# Patient Record
Sex: FEMALE | Race: WHITE | Employment: OTHER | URBAN - METROPOLITAN AREA
[De-identification: names, ages, dates, MRNs, and addresses within clinical notes are randomized per-mention and may not be internally consistent; named-entity substitution may affect disease eponyms.]

---

## 2018-11-12 ENCOUNTER — HOSPITAL ENCOUNTER (OUTPATIENT)
Dept: ULTRASOUND IMAGING | Age: 68
Discharge: HOME | End: 2018-11-12
Attending: INTERNAL MEDICINE
Payer: MEDICARE

## 2018-11-12 VITALS
OXYGEN SATURATION: 94 % | HEART RATE: 92 BPM | WEIGHT: 183 LBS | TEMPERATURE: 98.7 F | BODY MASS INDEX: 35.93 KG/M2 | HEIGHT: 60 IN | RESPIRATION RATE: 18 BRPM | DIASTOLIC BLOOD PRESSURE: 57 MMHG | SYSTOLIC BLOOD PRESSURE: 125 MMHG

## 2018-11-12 DIAGNOSIS — K76.0 FATTY (CHANGE OF) LIVER, NOT ELSEWHERE CLASSIFIED: ICD-10-CM

## 2018-11-12 LAB — GLUCOSE BLD STRIP.AUTO-MCNC: 158 MG/DL (ref 65–100)

## 2018-11-12 PROCEDURE — 88307 TISSUE EXAM BY PATHOLOGIST: CPT

## 2018-11-12 PROCEDURE — 82962 GLUCOSE BLOOD TEST: CPT

## 2018-11-12 PROCEDURE — 88313 SPECIAL STAINS GROUP 2: CPT

## 2018-11-12 PROCEDURE — 74011250636 HC RX REV CODE- 250/636: Performed by: RADIOLOGY

## 2018-11-12 PROCEDURE — 77030003503 US GUIDE BX LIV PERC

## 2018-11-12 PROCEDURE — 88312 SPECIAL STAINS GROUP 1: CPT

## 2018-11-12 PROCEDURE — 74011250636 HC RX REV CODE- 250/636

## 2018-11-12 PROCEDURE — 99152 MOD SED SAME PHYS/QHP 5/>YRS: CPT

## 2018-11-12 RX ORDER — MIDAZOLAM HYDROCHLORIDE 1 MG/ML
.5-2 INJECTION, SOLUTION INTRAMUSCULAR; INTRAVENOUS
Status: DISCONTINUED | OUTPATIENT
Start: 2018-11-12 | End: 2018-11-16 | Stop reason: HOSPADM

## 2018-11-12 RX ORDER — SODIUM CHLORIDE 9 MG/ML
25 INJECTION, SOLUTION INTRAVENOUS CONTINUOUS
Status: DISCONTINUED | OUTPATIENT
Start: 2018-11-12 | End: 2018-11-16 | Stop reason: HOSPADM

## 2018-11-12 RX ORDER — FENTANYL CITRATE 50 UG/ML
25-50 INJECTION, SOLUTION INTRAMUSCULAR; INTRAVENOUS
Status: DISCONTINUED | OUTPATIENT
Start: 2018-11-12 | End: 2018-11-16 | Stop reason: HOSPADM

## 2018-11-12 RX ORDER — IBUPROFEN 200 MG
600 TABLET ORAL
Status: DISCONTINUED | OUTPATIENT
Start: 2018-11-12 | End: 2018-11-16 | Stop reason: HOSPADM

## 2018-11-12 RX ORDER — LIDOCAINE HYDROCHLORIDE 20 MG/ML
1-10 INJECTION, SOLUTION EPIDURAL; INFILTRATION; INTRACAUDAL; PERINEURAL ONCE
Status: COMPLETED | OUTPATIENT
Start: 2018-11-12 | End: 2018-11-12

## 2018-11-12 RX ADMIN — MIDAZOLAM HYDROCHLORIDE 1 MG: 1 INJECTION, SOLUTION INTRAMUSCULAR; INTRAVENOUS at 08:32

## 2018-11-12 RX ADMIN — SODIUM CHLORIDE 25 ML/HR: 900 INJECTION, SOLUTION INTRAVENOUS at 08:20

## 2018-11-12 RX ADMIN — MIDAZOLAM HYDROCHLORIDE 1 MG: 1 INJECTION, SOLUTION INTRAMUSCULAR; INTRAVENOUS at 08:26

## 2018-11-12 RX ADMIN — FENTANYL CITRATE 50 MCG: 50 INJECTION, SOLUTION INTRAMUSCULAR; INTRAVENOUS at 08:26

## 2018-11-12 RX ADMIN — LIDOCAINE HYDROCHLORIDE 10 ML: 20 INJECTION, SOLUTION EPIDURAL; INFILTRATION; INTRACAUDAL; PERINEURAL at 08:33

## 2018-11-12 RX ADMIN — FENTANYL CITRATE 50 MCG: 50 INJECTION, SOLUTION INTRAMUSCULAR; INTRAVENOUS at 08:32

## 2018-11-12 NOTE — H&P
Department of Interventional Radiology  [phone]    History and Physical    Patient:  Alana Gore MRN:  [id number]  SSN:  [id number]    YOB: 1950  Age:  70 y.o. Sex:  female      Primary Care Provider:  Chantal Reid MD  Referring Physician:  Santiago Mcgee MD    Subjective:     Chief Complaint: biopsy     History of the Present Illness: The patient is a 70 y.o. female who presents for random liver biopsy. Imaging suggestive of cirrhosis. EGD with portal HTN, esophageal varices. NPO         Past Medical History:   Diagnosis Date    Arthritis     osteo    Cancer ([address]     left breast    Chronic pain     left knee    Diabetes ([facility] [address]     Type 2  -150   Hypoglycemia sx @ 79    GERD (gastroesophageal reflux disease)     controlled with medication    Hypercholesteremia     Hypertension     controlled with medication    Liver disease     fatty liver    Morbid obesity ([address] 11/3/14    BMI  38.6    Psychiatric disorder     depression    S/P total knee arthroplasty 2014    Thyroid disease     hypothyroid    Unspecified adverse effect of anesthesia     NO BP or Venipuncture left arm     Past Surgical History:   Procedure Laterality Date    HX  SECTION      HX HYSTERECTOMY      HX KNEE ARTHROSCOPY Left 1982    HX MASTECTOMY Left     HX OPEN CHOLECYSTECTOMY  2003    HX ORTHOPAEDIC Right 2014    carpal tunnel    HX ORTHOPAEDIC Left     ganglion cyst removed from knee    HX ORTHOPAEDIC Left     carpal tunnel    HX ORTHOPAEDIC Left     ganglion cyst wrist        Review of Systems:    Pertinent items are noted in the History of Present Illness. Current Outpatient Medications   Medication Sig    losartan (COZAAR) 100 mg tablet Take 100 mg by mouth daily. Indications: HYPERTENSION    metformin (GLUCOPHAGE) 1,000 mg tablet Take 2,000 mg by mouth every evening.  Indications: TYPE 2 DIABETES MELLITUS    insulin aspart (NOVOLOG FLEXPEN) 100 unit/mL flexpen 18 Units by SubCUTAneous route two (2) times daily (with meals). Breakfast & lunch   Indications: TYPE 2 DIABETES MELLITUS    insulin glargine (LANTUS SOLOSTAR) 100 unit/mL (3 mL) pen 80 Units by SubCUTAneous route daily. Take 1/2 usual AM dose DOS. Take 40 units DOS   Indications: TYPE 2 DIABETES MELLITUS    oxybutynin (DITROPAN) 5 mg tablet Take 5 mg by mouth nightly. Indications: BLADDER HYPERACTIVITY    simvastatin (ZOCOR) 20 mg tablet Take  by mouth nightly.  thyroid, Pork, (ARMOUR THYROID) 60 mg tablet Take 60 mg by mouth daily. Take DOS per anesthesia protocol. Indications: HYPOTHYROIDISM    omeprazole (PRILOSEC) 20 mg capsule Take 20 mg by mouth every morning. Take DOS per anesthesia protocol. Indications: GASTROESOPHAGEAL REFLUX    oxyCODONE IR (ROXICODONE) 5 mg immediate release tablet Take 1 tablet by mouth every four (4) hours as needed.  insulin aspart (NOVOLOG FLEXPEN) 100 unit/mL flexpen 16 Units by SubCUTAneous route every evening. Indications: TYPE 2 DIABETES MELLITUS    diphenhydrAMINE (BENADRYL) 25 mg capsule Take 25 mg by mouth every six (6) hours as needed. Take if needed DOS per anesthesia protocol. Indications: itching    citalopram (CELEXA) 40 mg tablet Take 60 mg by mouth every evening. Indications: depression    multivitamin (ONE A DAY) tablet Take 1 Tab by mouth daily.  Last dose 5/9/14     Current Facility-Administered Medications   Medication Dose Route Frequency    lidocaine (PF) (XYLOCAINE) 20 mg/mL (2 %) injection  mg  1-10 mL SubCUTAneous ONCE    0.9% sodium chloride infusion  25 mL/hr IntraVENous CONTINUOUS    fentaNYL citrate (PF) injection 25-50 mcg  25-50 mcg IntraVENous Multiple    midazolam (VERSED) injection 0.5-2 mg  0.5-2 mg IntraVENous Multiple        Allergies   Allergen Reactions    Demerol [Meperidine] Other (comments)     Stopped breathing    Dilaudid [Hydromorphone] Other (comments)     hallucinations    Macrodantin [Nitrofurantoin Macrocrystalline] Other (comments)     Shaking, passed out    Sulfa (Sulfonamide Antibiotics) Swelling     swelling in throat, hives    Tape [Adhesive] Rash     bandaids       Family History   Problem Relation Age of Onset    Alzheimer Mother     Other Father          of ruptured AAA    Heart defect Sister          of MI age 68    Other Sister         [place]il [place] disease-2 sisters with it   [facility] Alcohol abuse Brother     Diabetes Sister     Colon Cancer Sister     Colon Cancer Brother     Stroke Sister     Dementia Sister     Sudden Death Other      Social History     Tobacco Use    Smoking status: Never Smoker    Smokeless tobacco: Never Used   Substance Use Topics    Alcohol use: No        Objective:       Physical Examination:    Vitals:    18 0718   BP: 137/62   Pulse: 92   Resp: 16   Temp: 98.7 °F (37.1 °C)   SpO2: 94%   Weight: 83 kg (183 lb)   Height: 5' (1.524 m)     Blood pressure 137/62, pulse 92, temperature 98.7 °F (37.1 °C), resp. rate 16, height 5' (1.524 m), weight 83 kg (183 lb), SpO2 94 %, not currently breastfeeding.   HEART: regular rate and rhythm  LUNG: clear to auscultation bilaterally  ABDOMEN: normal findings: soft, non-tender  EXTREMITIES: normal strength, tone, and muscle mass, no deformities, no erythema, induration, or nodules    Laboratory:     Lab Results   Component Value Date/Time    Sodium 140 2015 08:35 AM    Sodium 138 2014 01:15 AM    Potassium 3.7 2015 08:35 AM    Potassium 3.9 2014 01:15 AM    Chloride 94 (L) 2015 08:35 AM    Chloride 102 2014 01:15 AM    CO2 29 2015 08:35 AM    CO2 28 2014 01:15 AM    Anion gap 8 2014 01:15 AM    Anion gap 9 2014 03:53 AM    Glucose 159 (H) 2015 08:35 AM    Glucose 107 (H) 2014 01:15 AM    BUN 13 2015 08:35 AM    BUN 12 2014 01:15 AM    Creatinine 0.80 2015 08:35 AM    Creatinine 0.90 2014 01:15 AM GFR est AA 89 11/30/2015 08:35 AM    GFR est AA >60 11/24/2014 01:15 AM    GFR est non-AA 77 11/30/2015 08:35 AM    GFR est non-AA >60 11/24/2014 01:15 AM    Calcium 9.8 11/30/2015 08:35 AM    Calcium 8.7 11/24/2014 01:15 AM    ALT (SGPT) 18 11/30/2015 08:35 AM     Lab Results   Component Value Date/Time    WBC 11.0 11/24/2014 01:15 AM    WBC 9.2 11/03/2014 11:00 AM    HGB 8.4 (L) 11/24/2014 01:15 AM    HGB 8.5 (L) 11/23/2014 04:25 AM    HCT 27.2 (L) 11/24/2014 01:15 AM    HCT 39.6 11/03/2014 11:00 AM    PLATELET 027 62/54/3463 01:15 AM    PLATELET 919 22/13/7746 11:00 AM     Lab Results   Component Value Date/Time    aPTT 26.2 11/03/2014 11:00 AM    Prothrombin time 10.6 11/03/2014 11:00 AM    INR 1.0 11/03/2014 11:00 AM       Assessment:     Fatty liver    Plan:     Planned Procedure:  Liver biopsy    Risks, benefits, and alternatives reviewed with patient and she agrees to proceed with the procedure.       Signed By: Adrian Vo PA-C     November 12, 2018

## 2018-11-12 NOTE — PROGRESS NOTES
TRANSFER - OUT REPORT:    Verbal report given to Naila Ham RN on Valeria Pyle  being transferred to  for routine progression of care       Report consisted of patients Situation, Background, Assessment and   Recommendations(SBAR). Information from the following report(s) Procedure Summary and MAR was reviewed with the receiving nurse. Lines:   Peripheral IV 11/12/18 Right Wrist (Active)   Site Assessment Clean, dry, & intact 11/12/2018  7:36 AM   Phlebitis Assessment 0 11/12/2018  7:36 AM   Dressing Status Clean, dry, & intact 11/12/2018  7:36 AM   Dressing Type Transparent 11/12/2018  7:36 AM        Opportunity for questions and clarification was provided.       Patient transported with:   Registered Nurse

## 2018-11-12 NOTE — PROCEDURES
Department of Interventional Radiology  [phone]        Interventional Radiology Brief Procedure Note    Patient: Dae Ireland MRN: [id number]  SSN: [id number]    YOB: 1950  Age: 70 y.o.   Sex: female      Date of Procedure: 11/12/2018    Pre-Procedure Diagnosis: elevated LFTs    Post-Procedure Diagnosis: SAME    Procedure(s): Image Guided Biopsy    Brief Description of Procedure: Ultrasound guided random liver biopsy    Performed By: Dixon Avelar MD     Assistants: None    Anesthesia:Moderate Sedation    Estimated Blood Loss: Less than 10ml    Specimens:  3 x 18 g cores    Implants:  None    Findings: Cirrhotic liver    Complications: None    Recommendations: routine post care     Follow Up: as needed    Signed By: Dixon Avelar MD     November 12, 2018

## 2018-11-12 NOTE — PROGRESS NOTES
Recovery period without difficulty. Pt alert and oriented and denies pain. Dressing is clean, dry, and intact. Reviewed discharge instructions with patient and , both verbalized understanding. Pt escorted to lobby discharge area via wheelchair. Vital signs and [place] score completed.

## 2018-11-12 NOTE — DISCHARGE INSTRUCTIONS
[address]  Department of Interventional Radiology  [facility]  [phone] Office  [phone] Fax    BIOPSY DISCHARGE INSTRUCTIONS    General Instructions:     A biopsy is the removal of a small piece of tissue for microscopic examination or testing. Healthy tissue can be obtained for the purpose of tissue-type matching for transplants. Unhealthy tissues are more commonly biopsied to diagnose disease. Lung Biopsy:     A needle lung biopsy is performed when there is a mass discovered in the lung area. The most serious risk is infection, bleeding, and pneumothorax (a collapsed lung). Signs of pneumothorax include shortness of breath, rapid heart rate, and blueness of the skin. If any of these occur, call 911. Liver Biopsy: This test helps detect cancer, infections, and the cause of an enlargement of the liver or elevated liver enzymes. It also helps to diagnose a number of liver diseases. The pain associated with the procedure may be felt in the shoulder. The risks include internal bleeding, pneumothorax, and injury to the surrounding organs. Renal Biopsy: This procedure is sometimes done to evaluate a transplanted kidney. It is also used to evaluate unexplained decrease in kidney function, blood, or protein in the urine. You may see bright red blood in the urine the first 24 hours after the test. If the bleeding lasts longer, you need to call your doctor. There is a risk of infection and bleeding into the muscle, which may cause soreness. Spinal Biopsy: This test is sometimes done in conjunction with other procedures. Your back will be sore, as we are taking a small sample of bone, which is slightly more difficult to sample than tissue. General Biopsy:     A mass can grow in any area of the body, and we are taking a specimen as ordered by your doctor. The risks are the same.  They include bleeding, pain, and infection. Home Care Instructions: You may resume your regular diet and medication regimen. Do not drink alcohol, drive, or make any important legal decisions in the next 24 hours. Do not lift anything heavier than a gallon of milk until the soreness goes away. You may use over the counter acetaminophen or ibuprofen for the soreness. You may apply an ice pack to the affected area for 20-30 minutes at time for the first 24 hours. After that, you may apply a heat pack. Call If: You should call your Physician and/or the Radiology Nurse if you have any questions or concerns about the biopsy site. Call if you should have increased pain, fever, redness, drainage, or bleeding more than a small spot on the bandage. Follow-Up Instructions: Please see your ordering doctor as he/she has requested. To Reach Us: If you have any questions about your procedure, please call the Interventional Radiology department at [phone]. After business hours (5pm) and weekends, call the answering service at [phone] and ask for the Radiologist on call to be paged. Interventional Radiology General Nurse Discharge    After general anesthesia or intravenous sedation, for 24 hours or while taking prescription Narcotics:  · Limit your activities  · Do not drive and operate hazardous machinery  · Do not make important personal or business decisions  · Do  not drink alcoholic beverages  · If you have not urinated within 8 hours after discharge, please contact your surgeon on call. * Please give a list of your current medications to your Primary Care Provider. * Please update this list whenever your medications are discontinued, doses are     changed, or new medications (including over-the-counter products) are added. * Please carry medication information at all times in case of emergency situations.     These are general instructions for a healthy lifestyle:    No smoking/ No tobacco products/ Avoid exposure to second hand smoke  Surgeon General's Warning:  Quitting smoking now greatly reduces serious risk to your health. Obesity, smoking, and sedentary lifestyle greatly increases your risk for illness  A healthy diet, regular physical exercise & weight monitoring are important for maintaining a healthy lifestyle    You may be retaining fluid if you have a history of heart failure or if you experience any of the following symptoms:  Weight gain of 3 pounds or more overnight or 5 pounds in a week, increased swelling in our hands or feet or shortness of breath while lying flat in bed. Please call your doctor as soon as you notice any of these symptoms; do not wait until your next office visit. Recognize signs and symptoms of STROKE:  F-face looks uneven    A-arms unable to move or move unevenly    S-speech slurred or non-existent    T-time-call 911 as soon as signs and symptoms begin-DO NOT go       Back to bed or wait to see if you get better-TIME IS BRAIN.             Patient Signature:  Date: 11/12/2018  Discharging Nurse: Edwige Reis

## 2018-11-12 NOTE — H&P
History and Physical    Patient: La Beth MRN: [id number]  SSN: [id number]    YOB: 1950  Age: 70 y.o. Sex: female      Subjective:      La Beth is a 70 y.o. female who has elevated liver enzymes and presents for random liver biopsy. Past Medical History:   Diagnosis Date    Arthritis     osteo    Cancer [address] [address]     left breast    Chronic pain     left knee    Diabetes ([facility] [address]     Type 2  -150   Hypoglycemia sx @ 78    GERD (gastroesophageal reflux disease)     controlled with medication    Hypercholesteremia     Hypertension     controlled with medication    Liver disease     fatty liver    Morbid obesity ([facility] [address] 11/3/14    BMI  38.6    Psychiatric disorder     depression    S/P total knee arthroplasty 2014    Thyroid disease     hypothyroid    Unspecified adverse effect of anesthesia     NO BP or Venipuncture left arm     Past Surgical History:   Procedure Laterality Date    HX  SECTION      HX HYSTERECTOMY      HX KNEE ARTHROSCOPY Left     HX MASTECTOMY Left     HX OPEN CHOLECYSTECTOMY  2003    HX ORTHOPAEDIC Right 2014    carpal tunnel    HX ORTHOPAEDIC Left     ganglion cyst removed from knee    HX ORTHOPAEDIC Left     carpal tunnel    HX ORTHOPAEDIC Left 1973    ganglion cyst wrist      Family History   Problem Relation Age of Onset    Alzheimer Mother     Other Father          of ruptured AAA    Heart defect Sister          of MI age 68    Other Sister         [place] disease-2 sisters with it   [email or web address] Alcohol abuse Brother     Diabetes Sister     Colon Cancer Sister     Colon Cancer Brother     Stroke Sister     Dementia Sister     Sudden Death Other      Social History     Tobacco Use    Smoking status: Never Smoker    Smokeless tobacco: Never Used   Substance Use Topics    Alcohol use: No      Prior to Admission medications    Medication Sig Start Date End Date Taking?  Authorizing Provider   losartan (COZAAR) 100 mg tablet Take 100 mg by mouth daily. Indications: HYPERTENSION   Yes Provider, Historical   metformin (GLUCOPHAGE) 1,000 mg tablet Take 2,000 mg by mouth every evening. Indications: TYPE 2 DIABETES MELLITUS   Yes Provider, Historical   insulin aspart (NOVOLOG FLEXPEN) 100 unit/mL flexpen 18 Units by SubCUTAneous route two (2) times daily (with meals). Breakfast & lunch   Indications: TYPE 2 DIABETES MELLITUS   Yes Provider, Historical   insulin glargine (LANTUS SOLOSTAR) 100 unit/mL (3 mL) pen 80 Units by SubCUTAneous route daily. Take 1/2 usual AM dose DOS. Take 40 units DOS   Indications: TYPE 2 DIABETES MELLITUS   Yes Provider, Historical   oxybutynin (DITROPAN) 5 mg tablet Take 5 mg by mouth nightly. Indications: BLADDER HYPERACTIVITY   Yes Provider, Historical   simvastatin (ZOCOR) 20 mg tablet Take  by mouth nightly. Yes Provider, Historical   thyroid, Pork, (ARMOUR THYROID) 60 mg tablet Take 60 mg by mouth daily. Take DOS per anesthesia protocol. Indications: HYPOTHYROIDISM   Yes Provider, Historical   omeprazole (PRILOSEC) 20 mg capsule Take 20 mg by mouth every morning. Take DOS per anesthesia protocol. Indications: GASTROESOPHAGEAL REFLUX   Yes Provider, Historical   oxyCODONE IR (ROXICODONE) 5 mg immediate release tablet Take 1 tablet by mouth every four (4) hours as needed. 11/24/14   PATRICIA Blackburn   insulin aspart (NOVOLOG FLEXPEN) 100 unit/mL flexpen 16 Units by SubCUTAneous route every evening. Indications: TYPE 2 DIABETES MELLITUS    Provider, Historical   diphenhydrAMINE (BENADRYL) 25 mg capsule Take 25 mg by mouth every six (6) hours as needed. Take if needed DOS per anesthesia protocol. Indications: itching    Provider, Historical   citalopram (CELEXA) 40 mg tablet Take 60 mg by mouth every evening. Indications: depression    Provider, Historical   multivitamin (ONE A DAY) tablet Take 1 Tab by mouth daily.  Last dose 5/9/14    Provider, Historical Allergies   Allergen Reactions    Demerol [Meperidine] Other (comments)     Stopped breathing    Dilaudid [Hydromorphone] Other (comments)     hallucinations    Macrodantin [Nitrofurantoin Macrocrystalline] Other (comments)     Shaking, passed out    Sulfa (Sulfonamide Antibiotics) Swelling     swelling in throat, hives    Tape [Adhesive] Rash     bandaids       Review of Systems:  Pertinent items are noted in the History of Present Illness.     Objective:     Vitals:    11/12/18 0718   BP: 137/62   Pulse: 92   Resp: 16   Temp: 98.7 °F (37.1 °C)   SpO2: 94%   Weight: 83 kg (183 lb)   Height: 5' (1.524 m)        Physical Exam:  LUNG: clear to auscultation bilaterally  rrr    Assessment:     Hospital Problems  Date Reviewed: 5/15/2014    None          Plan:     Ultrasound guided liver biopsy    Signed By: Chen Adair MD     November 12, 2018

## 2019-08-13 ENCOUNTER — ANESTHESIA EVENT (OUTPATIENT)
Dept: ENDOSCOPY | Age: 69
End: 2019-08-13
Payer: MEDICARE

## 2019-08-13 RX ORDER — SODIUM CHLORIDE, SODIUM LACTATE, POTASSIUM CHLORIDE, CALCIUM CHLORIDE 600; 310; 30; 20 MG/100ML; MG/100ML; MG/100ML; MG/100ML
100 INJECTION, SOLUTION INTRAVENOUS CONTINUOUS
Status: CANCELLED | OUTPATIENT
Start: 2019-08-13

## 2019-08-14 ENCOUNTER — ANESTHESIA (OUTPATIENT)
Dept: ENDOSCOPY | Age: 69
End: 2019-08-14
Payer: MEDICARE

## 2019-08-14 ENCOUNTER — HOSPITAL ENCOUNTER (OUTPATIENT)
Age: 69
Setting detail: OUTPATIENT SURGERY
Discharge: HOME | End: 2019-08-14
Attending: INTERNAL MEDICINE | Admitting: INTERNAL MEDICINE
Payer: MEDICARE

## 2019-08-14 VITALS
RESPIRATION RATE: 17 BRPM | WEIGHT: 154 LBS | DIASTOLIC BLOOD PRESSURE: 60 MMHG | HEART RATE: 75 BPM | SYSTOLIC BLOOD PRESSURE: 132 MMHG | HEIGHT: 60 IN | OXYGEN SATURATION: 97 % | TEMPERATURE: 96.8 F | BODY MASS INDEX: 30.23 KG/M2

## 2019-08-14 LAB — GLUCOSE BLD STRIP.AUTO-MCNC: 157 MG/DL (ref 65–100)

## 2019-08-14 PROCEDURE — 76040000025: Performed by: INTERNAL MEDICINE

## 2019-08-14 PROCEDURE — 74011250636 HC RX REV CODE- 250/636

## 2019-08-14 PROCEDURE — 82962 GLUCOSE BLOOD TEST: CPT

## 2019-08-14 PROCEDURE — 74011250636 HC RX REV CODE- 250/636: Performed by: ANESTHESIOLOGY

## 2019-08-14 PROCEDURE — 76060000031 HC ANESTHESIA FIRST 0.5 HR: Performed by: INTERNAL MEDICINE

## 2019-08-14 RX ORDER — SODIUM CHLORIDE, SODIUM LACTATE, POTASSIUM CHLORIDE, CALCIUM CHLORIDE 600; 310; 30; 20 MG/100ML; MG/100ML; MG/100ML; MG/100ML
100 INJECTION, SOLUTION INTRAVENOUS CONTINUOUS
Status: DISCONTINUED | OUTPATIENT
Start: 2019-08-14 | End: 2019-08-14 | Stop reason: HOSPADM

## 2019-08-14 RX ORDER — LIDOCAINE HYDROCHLORIDE 20 MG/ML
INJECTION, SOLUTION EPIDURAL; INFILTRATION; INTRACAUDAL; PERINEURAL AS NEEDED
Status: DISCONTINUED | OUTPATIENT
Start: 2019-08-14 | End: 2019-08-14 | Stop reason: HOSPADM

## 2019-08-14 RX ORDER — PROPOFOL 10 MG/ML
INJECTION, EMULSION INTRAVENOUS AS NEEDED
Status: DISCONTINUED | OUTPATIENT
Start: 2019-08-14 | End: 2019-08-14 | Stop reason: HOSPADM

## 2019-08-14 RX ADMIN — PROPOFOL 20 MG: 10 INJECTION, EMULSION INTRAVENOUS at 10:21

## 2019-08-14 RX ADMIN — PROPOFOL 60 MG: 10 INJECTION, EMULSION INTRAVENOUS at 10:17

## 2019-08-14 RX ADMIN — PROPOFOL 20 MG: 10 INJECTION, EMULSION INTRAVENOUS at 10:19

## 2019-08-14 RX ADMIN — SODIUM CHLORIDE, SODIUM LACTATE, POTASSIUM CHLORIDE, AND CALCIUM CHLORIDE 100 ML/HR: 600; 310; 30; 20 INJECTION, SOLUTION INTRAVENOUS at 09:06

## 2019-08-14 RX ADMIN — LIDOCAINE HYDROCHLORIDE 80 MG: 20 INJECTION, SOLUTION EPIDURAL; INFILTRATION; INTRACAUDAL; PERINEURAL at 10:17

## 2019-08-14 NOTE — H&P
[facility] H&P (Admission)        Date:  2019    Chief Complaint:  Portal hypertension    Subjective:     History of Present Illness:  Patient is a 78 y.o. being admitted for EGD. PMH:  Past Medical History:   Diagnosis Date    Arthritis     osteo    Cancer ([facility] [address]     left breast    Chronic pain     left knee    Cirrhosis ([facility] [address]     Dementia     Diabetes ([facility] [address]     Type 2  -150   Hypoglycemia sx @ 78    Epistaxis     GERD (gastroesophageal reflux disease)     controlled with medication    GI bleed     Hypercholesteremia     Hypertension     controlled with medication    Morbid obesity ([facility] [address] 11/3/14    BMI  38.6    Psychiatric disorder     depression    S/P total knee arthroplasty 2014    Thyroid disease     hypothyroid    Unspecified adverse effect of anesthesia     NO BP or Venipuncture left arm       PSH:  Past Surgical History:   Procedure Laterality Date    HX  SECTION      HX HYSTERECTOMY      HX KNEE ARTHROSCOPY Left     HX MASTECTOMY Left     HX OPEN CHOLECYSTECTOMY      HX ORTHOPAEDIC Right 2014    carpal tunnel    HX ORTHOPAEDIC Left     ganglion cyst removed from knee    HX ORTHOPAEDIC Left     carpal tunnel    HX ORTHOPAEDIC Left     ganglion cyst wrist       Allergies: Allergies   Allergen Reactions    Demerol [Meperidine] Other (comments)     Stopped breathing    Dilaudid [Hydromorphone] Other (comments)     hallucinations    Macrodantin [Nitrofurantoin Macrocrystalline] Other (comments)     Shaking, passed out    Sulfa (Sulfonamide Antibiotics) Swelling     swelling in throat, hives    Tape [Adhesive] Rash     bandaids       Home Medications:  Prior to Admission medications    Medication Sig Start Date End Date Taking? Authorizing Provider   insulin glargine (LANTUS U-100 INSULIN) 100 unit/mL injection 30 Units by SubCUTAneous route two (2) times a day.    Yes Provider, Historical   insulin regular (HUMULIN R REGULAR U-100 INSULN) 100 unit/mL injection by SubCUTAneous route. 7 units at lunch   Yes Provider, Historical   fesoterodine (TOVIAZ) 4 mg SR tablet Take  by mouth nightly. Yes Provider, Historical   dicyclomine (BENTYL) 10 mg capsule Take 20 mg by mouth 4 times daily (before meals and nightly). Yes Provider, Historical   venlafaxine-SR (EFFEXOR-XR) 150 mg capsule Take 150 mg by mouth daily. Yes Provider, Historical   omeprazole (PRILOSEC) 20 mg capsule Take 20 mg by mouth daily. Yes Provider, Historical   levothyroxine (SYNTHROID) 50 mcg tablet Take  by mouth Daily (before breakfast). Yes Provider, Historical   cholecalciferol (VITAMIN D3) 1,000 unit cap Take  by mouth daily. Yes Provider, Historical   cyanocobalamin, vitamin B-12, (VITAMIN B-12 PO) Take  by mouth. Yes Provider, Historical   OTHER Supplement for liver   Yes Provider, Historical   docosahexanoic acid/epa (FISH OIL PO) Take  by mouth. Yes Provider, Historical   losartan (COZAAR) 100 mg tablet Take 100 mg by mouth daily. Indications: HYPERTENSION   Yes Provider, Historical   diphenhydrAMINE (BENADRYL) 25 mg capsule Take 25 mg by mouth every six (6) hours as needed. Take if needed DOS per anesthesia protocol. Indications: itching   Yes Provider, Historical   simvastatin (ZOCOR) 20 mg tablet Take  by mouth nightly. Yes Provider, Historical   citalopram (CELEXA) 40 mg tablet Take 60 mg by mouth every evening. Indications: depression   Yes Provider, Historical   multivitamin (ONE A DAY) tablet Take 1 Tab by mouth daily.  Last dose 5/9/14   Yes Provider, Historical       Hospital Medications:  Current Facility-Administered Medications   Medication Dose Route Frequency    lactated Ringers infusion  100 mL/hr IntraVENous CONTINUOUS       Social History:  Social History     Tobacco Use    Smoking status: Never Smoker    Smokeless tobacco: Never Used   Substance Use Topics    Alcohol use: No         Family History:  Family History   Problem Relation Age of Onset    Alzheimer Mother     Other Father          of ruptured AAA    Heart defect Sister          of MI age 68    Other Sister         Vera Almonteinz disease-2 sisters with it    Alcohol abuse Brother     Diabetes Sister     Colon Cancer Sister     Colon Cancer Brother     Stroke Sister     Dementia Sister     Sudden Death Other        Review of Systems:  A detailed 10 system ROS is obtained, with pertinent positives as listed above. All others are negative. Objective:     Physical Exam:  Vitals:  Visit Vitals  /64   Pulse 65   Temp 98.4 °F (36.9 °C)   Resp 14   Ht 5' (1.524 m)   Wt 69.9 kg (154 lb)   SpO2 97%   Breastfeeding? No   BMI 30.08 kg/m²     Gen:  Pt is alert, cooperative, no acute distress  Skin: no large lesions  HEENT: MMM  Cardiovascular: Regular rate and rhythm. No murmurs, gallops, or rubs. Respiratory:  Comfortable breathing with no accessory muscle use. Clear breath sounds with no wheezes, rales, or rhonchi. GI:  Abdomen nondistended, soft, and nontender. Normal active bowel sounds. Neurological:  Gross memory appears intact. Patient is alert and oriented. Psychiatric:  Mood appears appropriate with judgement intact. Laboratory:    No results for input(s): WBC, HGB, HCT, PLT, MCV, NA, K, CL, CO2, BUN, CREA, CA, MG, GLU, AP, SGOT, GPT, TBIL, CBIL, ALB, TP, AML, LPSE, PTP, INR, APTT, HGBEXT, HCTEXT, PLTEXT in the last 72 hours. No lab exists for component: DBIL, INREXT    Assessment:       Active Problems:    * No active hospital problems. *      Plan:     Endoscopy and risks explained to the patient. Risks including reaction to sedation, cardiopulmonary events, infection, bleeding, perforation, requirement for surgery if complications should occur, death were explained to patient who expressed understanding and agreed to proceed with endoscopy.         Aditya Hoffman MD  [facility], [place]

## 2019-08-14 NOTE — ANESTHESIA POSTPROCEDURE EVALUATION
Procedure(s):  ESOPHAGOGASTRODUODENOSCOPY (EGD)/BMI 32.    total IV anesthesia    Anesthesia Post Evaluation        Patient location during evaluation: PACU  Patient participation: complete - patient participated  Level of consciousness: awake  Pain management: adequate  Airway patency: patent  Anesthetic complications: no  Cardiovascular status: acceptable  Respiratory status: spontaneous ventilation and acceptable  Hydration status: acceptable        Vitals Value Taken Time   /57 8/14/2019 10:39 AM   Temp 36 °C (96.8 °F) 8/14/2019 10:30 AM   Pulse 78 8/14/2019 10:45 AM   Resp 15 8/14/2019 10:39 AM   SpO2 93 % 8/14/2019 10:45 AM   Vitals shown include unvalidated device data.

## 2019-08-14 NOTE — ROUTINE PROCESS
VSS at discharge. No complaints noted. Education reviewed and signed with patient and . Pt discharged via wheelchair by gianluca Arevalo. Patient to be driven home by son.

## 2019-08-14 NOTE — ANESTHESIA PREPROCEDURE EVALUATION
Anesthetic History   No history of anesthetic complications            Review of Systems / Medical History  Patient summary reviewed and pertinent labs reviewed    Pulmonary  Within defined limits                 Neuro/Psych         Dementia (mild)     Cardiovascular    Hypertension: well controlled              Exercise tolerance: >4 METS     GI/Hepatic/Renal     GERD: well controlled      Liver disease (Cirrhosis)     Endo/Other    Diabetes: well controlled, type 2  Hypothyroidism  Obesity and arthritis     Other Findings              Physical Exam    Airway  Mallampati: II  TM Distance: 4 - 6 cm  Neck ROM: normal range of motion   Mouth opening: Normal     Cardiovascular  Regular rate and rhythm,  S1 and S2 normal,  no murmur, click, rub, or gallop  Rhythm: regular  Rate: normal         Dental    Dentition: Caps/crowns  Comments: Several missing in top back   Pulmonary  Breath sounds clear to auscultation               Abdominal  GI exam deferred       Other Findings            Anesthetic Plan    ASA: 3  Anesthesia type: total IV anesthesia - femoral single shot          Induction: Intravenous  Anesthetic plan and risks discussed with: Patient and Spouse

## 2019-08-14 NOTE — PROCEDURES
Esophagogastroduodenoscopy    DATE of PROCEDURE: 8/14/2019    INDICATIONS:  1. Portal hypertension from cirrhosis     MEDICATIONS ADMINISTERED: MAC    INSTRUMENT: GIF    PROCEDURE:  After obtaining informed consent, the patient was placed in the left lateral position and sedated. The endoscope was advanced under direct vision without difficulty. The esophagus, stomach (including retroflexed views) and duodenum were evaluated. The patient was taken to the recovery area in stable condition. FINDINGS:  ESOPHAGUS: Medium size varices without high risk stigmata that flattened easily with insufflation. STOMACH: Portal hypertensive gastropathy present otherwise normal   DUODENUM: Normal with normal transverse views of the major papilla    Estimated blood loss: 0-minimal     PLAN:  1. Since the varices are now medium sized, begin carvedilol 6.25 mg b.i.d. Warned that beta blockers can mask some symptoms of hypoglycemia.      Joellen Lovell MD  [facility], [place]

## 2019-08-14 NOTE — DISCHARGE INSTRUCTIONS
Gastrointestinal Esophagogastroduodenoscopy (EGD) - Upper Exam Discharge Instructions    1. Call Dr. Karley Garrett at [phone] for any problems or questions. 2. Contact the doctor's office for follow up appointment as directed. 3. Medication may cause drowsiness for several hours, therefore, do not drive or operate machinery for remainder of the day. Do not make any legal decisions today. 4. No alcohol today. 5. Ordinarily, you may resume regular diet and activity after exam unless otherwise specified by your physician. 6. For mild soreness in your throat you may use Cepacol throat lozenges or warm salt-water gargles as needed. Any additional instructions:    1. Begin carvedilol 6.25 mg twice daily  2.  Office visit in 1-2 months

## 2019-12-27 ENCOUNTER — ANESTHESIA (OUTPATIENT)
Dept: ENDOSCOPY | Age: 69
End: 2019-12-27
Payer: MEDICARE

## 2019-12-27 ENCOUNTER — HOSPITAL ENCOUNTER (OUTPATIENT)
Age: 69
Setting detail: OUTPATIENT SURGERY
Discharge: HOME | End: 2019-12-27
Attending: INTERNAL MEDICINE | Admitting: INTERNAL MEDICINE
Payer: MEDICARE

## 2019-12-27 ENCOUNTER — ANESTHESIA EVENT (OUTPATIENT)
Dept: ENDOSCOPY | Age: 69
End: 2019-12-27
Payer: MEDICARE

## 2019-12-27 VITALS
OXYGEN SATURATION: 92 % | WEIGHT: 178 LBS | TEMPERATURE: 98 F | BODY MASS INDEX: 34.95 KG/M2 | HEIGHT: 60 IN | HEART RATE: 86 BPM | RESPIRATION RATE: 16 BRPM | SYSTOLIC BLOOD PRESSURE: 127 MMHG | DIASTOLIC BLOOD PRESSURE: 60 MMHG

## 2019-12-27 LAB — GLUCOSE BLD STRIP.AUTO-MCNC: 159 MG/DL (ref 65–100)

## 2019-12-27 PROCEDURE — 76060000032 HC ANESTHESIA 0.5 TO 1 HR: Performed by: INTERNAL MEDICINE

## 2019-12-27 PROCEDURE — 74011000250 HC RX REV CODE- 250: Performed by: NURSE ANESTHETIST, CERTIFIED REGISTERED

## 2019-12-27 PROCEDURE — 74011250636 HC RX REV CODE- 250/636: Performed by: NURSE ANESTHETIST, CERTIFIED REGISTERED

## 2019-12-27 PROCEDURE — 74011250636 HC RX REV CODE- 250/636: Performed by: ANESTHESIOLOGY

## 2019-12-27 PROCEDURE — 76040000025: Performed by: INTERNAL MEDICINE

## 2019-12-27 PROCEDURE — 77030014243 HC BND LIG VRCES BSC -D: Performed by: INTERNAL MEDICINE

## 2019-12-27 PROCEDURE — 82962 GLUCOSE BLOOD TEST: CPT

## 2019-12-27 RX ORDER — PROPOFOL 10 MG/ML
INJECTION, EMULSION INTRAVENOUS AS NEEDED
Status: DISCONTINUED | OUTPATIENT
Start: 2019-12-27 | End: 2019-12-27 | Stop reason: HOSPADM

## 2019-12-27 RX ORDER — PROPOFOL 10 MG/ML
INJECTION, EMULSION INTRAVENOUS
Status: DISCONTINUED | OUTPATIENT
Start: 2019-12-27 | End: 2019-12-27 | Stop reason: HOSPADM

## 2019-12-27 RX ORDER — SODIUM CHLORIDE, SODIUM LACTATE, POTASSIUM CHLORIDE, CALCIUM CHLORIDE 600; 310; 30; 20 MG/100ML; MG/100ML; MG/100ML; MG/100ML
100 INJECTION, SOLUTION INTRAVENOUS CONTINUOUS
Status: CANCELLED | OUTPATIENT
Start: 2019-12-27

## 2019-12-27 RX ORDER — SODIUM CHLORIDE 0.9 % (FLUSH) 0.9 %
5-40 SYRINGE (ML) INJECTION EVERY 8 HOURS
Status: CANCELLED | OUTPATIENT
Start: 2019-12-27

## 2019-12-27 RX ORDER — SODIUM CHLORIDE 0.9 % (FLUSH) 0.9 %
5-40 SYRINGE (ML) INJECTION AS NEEDED
Status: CANCELLED | OUTPATIENT
Start: 2019-12-27

## 2019-12-27 RX ORDER — SODIUM CHLORIDE, SODIUM LACTATE, POTASSIUM CHLORIDE, CALCIUM CHLORIDE 600; 310; 30; 20 MG/100ML; MG/100ML; MG/100ML; MG/100ML
100 INJECTION, SOLUTION INTRAVENOUS CONTINUOUS
Status: DISCONTINUED | OUTPATIENT
Start: 2019-12-27 | End: 2019-12-27 | Stop reason: HOSPADM

## 2019-12-27 RX ORDER — LIDOCAINE HYDROCHLORIDE 20 MG/ML
INJECTION, SOLUTION EPIDURAL; INFILTRATION; INTRACAUDAL; PERINEURAL AS NEEDED
Status: DISCONTINUED | OUTPATIENT
Start: 2019-12-27 | End: 2019-12-27 | Stop reason: HOSPADM

## 2019-12-27 RX ADMIN — PROPOFOL 20 MG: 10 INJECTION, EMULSION INTRAVENOUS at 09:37

## 2019-12-27 RX ADMIN — LIDOCAINE HYDROCHLORIDE 50 MG: 20 INJECTION, SOLUTION EPIDURAL; INFILTRATION; INTRACAUDAL; PERINEURAL at 09:34

## 2019-12-27 RX ADMIN — SODIUM CHLORIDE, SODIUM LACTATE, POTASSIUM CHLORIDE, AND CALCIUM CHLORIDE 100 ML/HR: 600; 310; 30; 20 INJECTION, SOLUTION INTRAVENOUS at 08:59

## 2019-12-27 RX ADMIN — PROPOFOL 140 MCG/KG/MIN: 10 INJECTION, EMULSION INTRAVENOUS at 09:34

## 2019-12-27 RX ADMIN — PROPOFOL 50 MG: 10 INJECTION, EMULSION INTRAVENOUS at 09:34

## 2019-12-27 NOTE — DISCHARGE INSTRUCTIONS
Gastrointestinal Esophagogastroduodenoscopy (EGD) - Upper Exam Discharge Instructions    1. Call Dr. Vickey Nguyen at [facility] for any problems or questions. 2. Contact the doctor's office for follow up appointment as directed. 3. Medication may cause drowsiness for several hours, therefore:  · Do not drive or operate machinery for remainder of the day. · No alcohol today. · Do not make any important or legal decisions for 24 hours. · Do not sign any legal documents for 24 hours. 5. Ordinarily, you may resume regular diet and activity after exam unless otherwise  specified by your physician. 6. For mild soreness in your throat you may use Cepacol throat lozenges or warm  salt-water gargles as needed. Any additional instructions:  Repeat in 1 month     Instructions given to London Munoz and other family members.   Instructions given by:

## 2019-12-27 NOTE — ANESTHESIA PREPROCEDURE EVALUATION
Anesthetic History   No history of anesthetic complications            Review of Systems / Medical History  Patient summary reviewed and pertinent labs reviewed    Pulmonary  Within defined limits                 Neuro/Psych         Dementia (mild)     Cardiovascular    Hypertension: well controlled  Valvular problems/murmurs: mitral insufficiency            Exercise tolerance: >4 METS  Comments: TTE Nov 2019  · The left ventricular systolic function is normal (55-65%). · Grade I (mild) left ventricular diastolic dysfunction present,   consistent with impaired relaxation. · The right ventricular systolic function is normal.  · The left atrium is mildly dilated. · The right atrium is mildly dilated. · Mild-to-moderate mitral valve regurgitation. The jet is posterior   directed.    GI/Hepatic/Renal     GERD: well controlled      Liver disease (Cirrhosis)     Endo/Other    Diabetes: well controlled, type 2  Hypothyroidism  Obesity and arthritis     Other Findings              Physical Exam    Airway  Mallampati: II  TM Distance: 4 - 6 cm  Neck ROM: normal range of motion   Mouth opening: Normal     Cardiovascular  Regular rate and rhythm,  S1 and S2 normal,  no murmur, click, rub, or gallop  Rhythm: regular  Rate: normal         Dental    Dentition: Caps/crowns  Comments: Several missing in top back   Pulmonary  Breath sounds clear to auscultation               Abdominal  GI exam deferred       Other Findings            Anesthetic Plan    ASA: 3  Anesthesia type: total IV anesthesia - femoral single shot          Induction: Intravenous  Anesthetic plan and risks discussed with: Patient and Spouse

## 2019-12-27 NOTE — PROCEDURES
Endoscopic Gastroduodenoscopy Procedure Note    Indications: esophageal varices, melena, anemia    Anesthesia/Sedation: MAC IV     Pre-Procedure Physical:    Current Facility-Administered Medications   Medication Dose Route Frequency    lactated Ringers infusion  100 mL/hr IntraVENous CONTINUOUS      Demerol [meperidine]; Dilaudid [hydromorphone]; Macrodantin [nitrofurantoin macrocrystalline]; Sulfa (sulfonamide antibiotics); and Tape [adhesive]    Patient Vitals for the past 8 hrs:   BP Temp Pulse Resp SpO2 Height Weight   12/27/19 0844 131/62 98.3 °F (36.8 °C) 78 16 97 % 5' (1.524 m) 80.7 kg (178 lb)       Exam      Airway: clear   Heart: normal S1and S2    Lungs: clear bilateral  Abdomen: soft, nontender, bowel sounds present and normal in all quads   Mental Status: awake, alert and oriented to person, place and time          Procedure Details     Informed consent was obtained for the procedure, including conscious sedation. Risks of pancreatitis, infection, perforation, hemorrhage, adverse drug reaction and aspiration were discussed. The patient was placed in the left lateral decubitus position. Based on the pre-procedure assessment, including review of the patient's medical history, medications, allergies, and review of systems, she had been deemed to be an appropriate candidate for conscious sedation; she was therefore sedated with the medications listed below. She was monitored continuously with ECG tracing, pulse oximetry, blood pressure monitoring, and direct observation. The EGD gastroscope was inserted into the mouth and advanced under direct vision to the second portion of the duodenum. A careful inspection was made as the gastroscope was withdrawn, including a retroflexed view of the proximal stomach; findings and interventions are described below. Appropriate photodocumentation was obtained. Findings:   Esophagus- Large nonbleeding esophageal varices. Banded x 6.   Stomach- Moderate portal HTN gastropathy. Some gastric varices in antrum. Duodenum- Normal.    Therapies: Banding    Specimens: None    Estimated Blood Loss: 0 cc           Complications:   None; patient tolerated the procedure well. Attending Attestation:  I performed the procedure. Impression:    Large nonbleeding esophageal varices. Banded x 6. Moderate portal HTN gastropathy. Some gastric varices in antrum.     Recommendations:  Repeat banding in 1 mo  Can consider TIPS in future too if continues to bleed

## 2019-12-27 NOTE — ANESTHESIA POSTPROCEDURE EVALUATION
Procedure(s):  ESOPHAGOGASTRODUODENOSCOPY (EGD)  ENDOSCOPIC BANDING OR LIGATION. total IV anesthesia    Anesthesia Post Evaluation      Multimodal analgesia: multimodal analgesia not used between 6 hours prior to anesthesia start to PACU discharge  Patient location during evaluation: bedside  Patient participation: complete - patient participated  Level of consciousness: awake and alert  Pain management: adequate  Airway patency: patent  Anesthetic complications: no  Cardiovascular status: acceptable  Respiratory status: acceptable, spontaneous ventilation and nonlabored ventilation  Hydration status: acceptable  Post anesthesia nausea and vomiting:  none      Vitals Value Taken Time   /60 12/27/2019 10:19 AM   Temp 36.7 °C (98 °F) 12/27/2019 10:00 AM   Pulse 85 12/27/2019 10:21 AM   Resp 16 12/27/2019 10:19 AM   SpO2 91 % 12/27/2019 10:21 AM   Vitals shown include unvalidated device data.

## 2019-12-27 NOTE — H&P
[facility] Consult Note           Referring Physician:     Consult Date: 2019    Reason for Consult: esophageal varices    History of Present Illness:  Patient is a 78 y.o. female who is seen in consultation for esophageal varices.      Past Medical History:   Diagnosis Date    Arthritis     osteo    Cancer ([facility] [address]     left breast    Chronic pain     left knee    [address]rhosis ([address]     Dementia ([address]     Diabetes ([facility] [address]     Type 2  -150   Hypoglycemia sx @ 78    Epistaxis     GERD (gastroesophageal reflux disease)     controlled with medication    GI bleed     Hypercholesteremia     Hypertension     controlled with medication    Morbid obesity ([address] 11/3/14    BMI  38.6    Psychiatric disorder     depression    S/P total knee arthroplasty 2014    Thyroid disease     hypothyroid    Unspecified adverse effect of anesthesia     NO BP or Venipuncture left arm      Past Surgical History:   Procedure Laterality Date    HX  SECTION      HX HYSTERECTOMY      HX KNEE ARTHROSCOPY Left     HX MASTECTOMY Left     HX OPEN CHOLECYSTECTOMY      HX ORTHOPAEDIC Right 2014    carpal tunnel    HX ORTHOPAEDIC Left     ganglion cyst removed from knee    HX ORTHOPAEDIC Left     carpal tunnel    HX ORTHOPAEDIC Left     ganglion cyst wrist      Family History   Problem Relation Age of Onset    Alzheimer Mother     Other Father          of ruptured AAA    Heart defect Sister          of MI age 68    Other Sister         Foy Peels disease-2 sisters with it   Lambert Alcohol abuse Brother     Diabetes Sister     Colon Cancer Sister     Colon Cancer Brother     Stroke Sister     Dementia Sister     Sudden Death Other      Social History     Occupational History    Not on file   Tobacco Use    Smoking status: Never Smoker    Smokeless tobacco: Never Used   Substance and Sexual Activity    Alcohol use: No    Drug use: No    Sexual activity: Not on file       Hospital Medications:  Current Facility-Administered Medications   Medication Dose Route Frequency    lactated Ringers infusion  100 mL/hr IntraVENous CONTINUOUS       Allergies: Allergies   Allergen Reactions    Demerol [Meperidine] Other (comments)     Stopped breathing    Dilaudid [Hydromorphone] Other (comments)     hallucinations    Macrodantin [Nitrofurantoin Macrocrystalline] Other (comments)     Shaking, passed out    Sulfa (Sulfonamide Antibiotics) Swelling     swelling in throat, hives    Tape [Adhesive] Rash     bandaids       Review of Systems:  A comprehensive review of systems was negative except for that written in the History of Present Illness. Objective:     Physical Exam:  Vitals:  Visit Vitals  /62   Pulse 78   Temp 98.3 °F (36.8 °C)   Resp 16   Ht 5' (1.524 m)   Wt 80.7 kg (178 lb)   SpO2 97%   Breastfeeding No   BMI 34.76 kg/m²       General: No acute distress. Skin:  Extremities and face reveal no rashes. No lai erythema. No telangiectasias on the chest wall. HEENT: Sclerae anicteric. No oral ulcers. No abnormal pigmentation of the lips. The neck is supple. Cardiovascular: Regular rate and rhythm. No murmurs, gallops, or rubs. Respiratory:  Comfortable breathing  With no accessory muscle use. Clear breath sounds with no wheezes, rales, or rhonchi. GI:  Abdomen nondistended, soft, and nontender. Normal active bowel sounds. No enlargement of the liver or spleen. No masses palpable. Musculoskeletal:  No pitting edema of the lower legs. Extremities have good range of motion. Neurological:  Gross memory appears intact. Patient is alert and oriented. Psychiatric:  Mood appears appropriate with judgement intact. Lymphatic:  No cervical or supraclavicular adenopathy. Laboratory:    No results for input(s): WBC, RBC, HGB, HCT, PLT, HGBEXT, HCTEXT, PLTEXT in the last 72 hours.    No results for input(s): GLU, NA, K, CL, CO2, BUN, CREA, CA in the last 72 hours. No results for input(s): PTP, INR, APTT, INREXT in the last 72 hours. No results for input(s): TBIL, CBIL, SGOT, GPT, AP, ALB, TP, AML, LPSE in the last 72 hours.     Assessment:       A 78 y.o. female with esophageal varices      Plan:       EGD    Signed By: Rafita Billings MD     December 27, 2019

## 2020-02-12 ENCOUNTER — HOSPITAL ENCOUNTER (OUTPATIENT)
Dept: INTERVENTIONAL RADIOLOGY/VASCULAR | Age: 70
Discharge: HOME | End: 2020-02-12
Attending: INTERNAL MEDICINE
Payer: MEDICARE

## 2020-02-12 VITALS
DIASTOLIC BLOOD PRESSURE: 56 MMHG | TEMPERATURE: 97.8 F | OXYGEN SATURATION: 96 % | RESPIRATION RATE: 14 BRPM | HEART RATE: 75 BPM | SYSTOLIC BLOOD PRESSURE: 130 MMHG

## 2020-02-12 DIAGNOSIS — R18.8 OTHER ASCITES: ICD-10-CM

## 2020-02-12 PROCEDURE — 76705 ECHO EXAM OF ABDOMEN: CPT

## 2020-02-12 PROCEDURE — 77030014146 HC TY THORCENT/PARACENT BD -B

## 2020-02-12 RX ORDER — MIRTAZAPINE 15 MG/1
15 TABLET, FILM COATED ORAL
COMMUNITY

## 2020-02-12 NOTE — DISCHARGE INSTRUCTIONS
[address]  Department of Interventional Radiology  [facility]  [phone] Office  [phone] Fax    PARACENTESIS DISCHARGE INSTRUCTIONS    General Information:  During this procedure, the doctor will insert a needle into the abdomen to drain fluid. After the procedure, you will be able to take a deep breath much easier. The site of the puncture may ooze the first day. This will decrease and eventually stop. Paracentesis (draining fluid from the abdomen) sometimes makes patients hypotensive (low blood pressure). Your doctor may order for you to receive fluids or albumin (a volume booster) during the procedure through an IV site. Home Care Instructions:  Keep the puncture site clean and dry. No tub baths or swimming until puncture site heals. Showering is acceptable. Resume your normal diet, and resume your normal activity slowly and as you tolerate. If you are short of breath, rest. If shortness of breath does not ease, please call your ordering doctor. Fluid can re-accumulate in the chest and/or in the abdomen. If this should occur, your doctor needs to know as you may need to have the procedure done again. Call If:     You should call your Physician and/or the Radiology Nurse if you notice any signs of infection, like pus draining, or if it is swollen or reddened. Also call if you have a fever, or if you are bleeding from the puncture site more than a small amount on the dressing. Call if the puncture site keeps draining fluid. Some oozing is to be expected, but should slow and then stop. Call if you feel like you have pressure in your abdomen. SEEK IMMEDIATE CARE OR CALL 911 IF YOU SUDDENLY HAVE TROUBLE BREATHING, OR IF YOUR LIPS TURN BLUE, OR IF YOU NOTICE BLOOD IN YOUR SPUTUM. Follow-Up Instructions: Please see your ordering doctor as he/she has requested. To Reach Us:     If you have any questions about your procedure, please call the Interventional Radiology department at [phone]. After business hours (5pm) and weekends, call the answering service at [phone] and ask for the Radiologist on call to be paged. Interventional Radiology General Nurse Discharge    After general anesthesia or intravenous sedation, for 24 hours or while taking prescription Narcotics:  · Limit your activities  · Do not drive and operate hazardous machinery  · Do not make important personal or business decisions  · Do  not drink alcoholic beverages  · If you have not urinated within 8 hours after discharge, please contact your surgeon on call. * Please give a list of your current medications to your Primary Care Provider. * Please update this list whenever your medications are discontinued, doses are     changed, or new medications (including over-the-counter products) are added. * Please carry medication information at all times in case of emergency situations. These are general instructions for a healthy lifestyle:    No smoking/ No tobacco products/ Avoid exposure to second hand smoke  Surgeon General's Warning:  Quitting smoking now greatly reduces serious risk to your health. Obesity, smoking, and sedentary lifestyle greatly increases your risk for illness  A healthy diet, regular physical exercise & weight monitoring are important for maintaining a healthy lifestyle    You may be retaining fluid if you have a history of heart failure or if you experience any of the following symptoms:  Weight gain of 3 pounds or more overnight or 5 pounds in a week, increased swelling in our hands or feet or shortness of breath while lying flat in bed. Please call your doctor as soon as you notice any of these symptoms; do not wait until your next office visit.     Recognize signs and symptoms of STROKE:  F-face looks uneven    A-arms unable to move or move unevenly    S-speech slurred or non-existent    T-time-call 911 as soon as signs and symptoms begin-DO NOT go       Back to bed or wait to see if you get better-TIME IS BRAIN.       Date: 2/12/2020  Discharging Nurse: Juan Hudson

## 2020-02-20 ENCOUNTER — ANESTHESIA EVENT (OUTPATIENT)
Dept: ENDOSCOPY | Age: 70
End: 2020-02-20
Payer: MEDICARE

## 2020-02-21 ENCOUNTER — ANESTHESIA (OUTPATIENT)
Dept: ENDOSCOPY | Age: 70
End: 2020-02-21
Payer: MEDICARE

## 2020-02-21 ENCOUNTER — HOSPITAL ENCOUNTER (OUTPATIENT)
Age: 70
Setting detail: OUTPATIENT SURGERY
Discharge: HOME | End: 2020-02-21
Attending: INTERNAL MEDICINE | Admitting: INTERNAL MEDICINE
Payer: MEDICARE

## 2020-02-21 VITALS
HEART RATE: 72 BPM | SYSTOLIC BLOOD PRESSURE: 117 MMHG | OXYGEN SATURATION: 97 % | TEMPERATURE: 97 F | DIASTOLIC BLOOD PRESSURE: 58 MMHG | RESPIRATION RATE: 16 BRPM

## 2020-02-21 LAB — GLUCOSE BLD STRIP.AUTO-MCNC: 111 MG/DL (ref 65–100)

## 2020-02-21 PROCEDURE — 76060000032 HC ANESTHESIA 0.5 TO 1 HR: Performed by: INTERNAL MEDICINE

## 2020-02-21 PROCEDURE — 77030014243 HC BND LIG VRCES BSC -D: Performed by: INTERNAL MEDICINE

## 2020-02-21 PROCEDURE — 74011250636 HC RX REV CODE- 250/636: Performed by: NURSE ANESTHETIST, CERTIFIED REGISTERED

## 2020-02-21 PROCEDURE — 76040000025: Performed by: INTERNAL MEDICINE

## 2020-02-21 PROCEDURE — 88312 SPECIAL STAINS GROUP 1: CPT

## 2020-02-21 PROCEDURE — 74011000250 HC RX REV CODE- 250: Performed by: NURSE ANESTHETIST, CERTIFIED REGISTERED

## 2020-02-21 PROCEDURE — 74011250636 HC RX REV CODE- 250/636: Performed by: ANESTHESIOLOGY

## 2020-02-21 PROCEDURE — 77030021593 HC FCPS BIOP ENDOSC BSC -A: Performed by: INTERNAL MEDICINE

## 2020-02-21 PROCEDURE — 82962 GLUCOSE BLOOD TEST: CPT

## 2020-02-21 PROCEDURE — 88305 TISSUE EXAM BY PATHOLOGIST: CPT

## 2020-02-21 RX ORDER — PROPOFOL 10 MG/ML
INJECTION, EMULSION INTRAVENOUS AS NEEDED
Status: DISCONTINUED | OUTPATIENT
Start: 2020-02-21 | End: 2020-02-21 | Stop reason: HOSPADM

## 2020-02-21 RX ORDER — PROPOFOL 10 MG/ML
INJECTION, EMULSION INTRAVENOUS
Status: DISCONTINUED | OUTPATIENT
Start: 2020-02-21 | End: 2020-02-21 | Stop reason: HOSPADM

## 2020-02-21 RX ORDER — LIDOCAINE HYDROCHLORIDE 20 MG/ML
INJECTION, SOLUTION EPIDURAL; INFILTRATION; INTRACAUDAL; PERINEURAL AS NEEDED
Status: DISCONTINUED | OUTPATIENT
Start: 2020-02-21 | End: 2020-02-21 | Stop reason: HOSPADM

## 2020-02-21 RX ORDER — SODIUM CHLORIDE, SODIUM LACTATE, POTASSIUM CHLORIDE, CALCIUM CHLORIDE 600; 310; 30; 20 MG/100ML; MG/100ML; MG/100ML; MG/100ML
100 INJECTION, SOLUTION INTRAVENOUS CONTINUOUS
Status: DISCONTINUED | OUTPATIENT
Start: 2020-02-21 | End: 2020-02-21 | Stop reason: HOSPADM

## 2020-02-21 RX ADMIN — PHENYLEPHRINE HYDROCHLORIDE 200 MCG: 10 INJECTION INTRAVENOUS at 13:08

## 2020-02-21 RX ADMIN — PROPOFOL 200 MCG/KG/MIN: 10 INJECTION, EMULSION INTRAVENOUS at 12:49

## 2020-02-21 RX ADMIN — SODIUM CHLORIDE, SODIUM LACTATE, POTASSIUM CHLORIDE, AND CALCIUM CHLORIDE 100 ML/HR: 600; 310; 30; 20 INJECTION, SOLUTION INTRAVENOUS at 12:38

## 2020-02-21 RX ADMIN — PROPOFOL 50 MG: 10 INJECTION, EMULSION INTRAVENOUS at 12:49

## 2020-02-21 RX ADMIN — PHENYLEPHRINE HYDROCHLORIDE 150 MCG: 10 INJECTION INTRAVENOUS at 13:03

## 2020-02-21 RX ADMIN — LIDOCAINE HYDROCHLORIDE 50 MG: 20 INJECTION, SOLUTION EPIDURAL; INFILTRATION; INTRACAUDAL; PERINEURAL at 12:49

## 2020-02-21 NOTE — PROCEDURES
Esophagogastroduodenoscopy    DATE of PROCEDURE: 2/21/2020    INDICATIONS:  1. Portal hypertension     MEDICATIONS ADMINISTERED: MAC    INSTRUMENT: GIF    PROCEDURE:  After obtaining informed consent, the patient was placed in the left lateral position and sedated. The endoscope was advanced under direct vision without difficulty. The esophagus, stomach (including retroflexed views) and duodenum were evaluated. The patient was taken to the recovery area in stable condition. FINDINGS:  ESOPHAGUS: Esophageal varices near the GE junction. Two rubber ligation bands successfully placed. No further areas amenable to rubber band ligation. STOMACH: Diffuse portal hypertensive gastropathy. Small ulcers in the antrum. Four random biopsies taken from the antrum, incisura and body of the stomach. DUODENUM: Multiple ulcers in the duodenal bulb and sweep     Estimated blood loss: 0-minimal     PLAN:  1. B.i.d. PPI  2. Repeat EGD 3-4 months  3. Avoid NSAIDs  4.  Follow with me in the office     Vianey Damico MD  [facility], [place]

## 2020-02-21 NOTE — ANESTHESIA PREPROCEDURE EVALUATION
Anesthetic History   No history of anesthetic complications            Review of Systems / Medical History  Patient summary reviewed and pertinent labs reviewed    Pulmonary  Within defined limits                 Neuro/Psych         Dementia (mild)     Cardiovascular    Hypertension: well controlled  Valvular problems/murmurs: mitral insufficiency            Exercise tolerance: >4 METS  Comments: TTE Nov 2019  · The left ventricular systolic function is normal (55-65%). · Grade I (mild) left ventricular diastolic dysfunction present,   consistent with impaired relaxation. · The right ventricular systolic function is normal.  · The left atrium is mildly dilated. · The right atrium is mildly dilated. · Mild-to-moderate mitral valve regurgitation. The jet is posterior   directed.    GI/Hepatic/Renal     GERD: well controlled      Liver disease (Cirrhosis)     Endo/Other    Diabetes: well controlled, type 2  Hypothyroidism: well controlled  Obesity, arthritis and anemia (Hb 9.9)     Other Findings              Physical Exam    Airway  Mallampati: II  TM Distance: 4 - 6 cm  Neck ROM: normal range of motion   Mouth opening: Normal     Cardiovascular  Regular rate and rhythm,  S1 and S2 normal,  no murmur, click, rub, or gallop  Rhythm: regular  Rate: normal         Dental    Dentition: Caps/crowns  Comments: Several missing in top back   Pulmonary  Breath sounds clear to auscultation               Abdominal  GI exam deferred       Other Findings            Anesthetic Plan    ASA: 3  Anesthesia type: total IV anesthesia - femoral single shot          Induction: Intravenous  Anesthetic plan and risks discussed with: Patient and Spouse

## 2020-02-21 NOTE — H&P
[facility] H&P (Admission)        Date:  2020    Chief Complaint:  Portal HTN    Subjective:     History of Present Illness:  Patient is a 78 y.o. being admitted for EGD. PMH:  Past Medical History:   Diagnosis Date    Anemia     iron supplement daily    Arthritis     osteo    Cancer ([facility]     left breast    Chronic pain     left knee    Cirrhosis ([facility]     Dementia ([facility]     Diabetes ([facility]     Type 2; checks bs tid; average bs 210; A1C=7.0 on 2020    Diverticulosis     Epistaxis     GERD (gastroesophageal reflux disease)     controlled with medication    GI bleed     Hypercholesteremia     on med     Hypertension     controlled with medication    Morbid obesity ([facility] [address] [facility] 2014    BMI=34.2    Psychiatric disorder     depression    S/P total knee arthroplasty 2014    Thyroid disease     hypothyroid=on med    Unspecified adverse effect of anesthesia     NO BP or Venipuncture left arm       PSH:  Past Surgical History:   Procedure Laterality Date    HX  SECTION      x1    HX COLECTOMY      HX DILATION AND EVACUATION      HX ENDOSCOPY  2019    HX HYSTERECTOMY      HX KNEE ARTHROSCOPY Left     HX KNEE REPLACEMENT Left     HX MASTECTOMY Left     HX ORTHOPAEDIC Right 2014    carpal tunnel    HX ORTHOPAEDIC Left     ganglion cyst removed from knee    HX ORTHOPAEDIC Left     carpal tunnel    HX ORTHOPAEDIC Left     ganglion cyst wrist       Allergies: Allergies   Allergen Reactions    Demerol [Meperidine] Other (comments)     Stopped breathing    Dilaudid [Hydromorphone] Other (comments)     hallucinations    Macrodantin [Nitrofurantoin Macrocrystalline] Other (comments)     Shaking, passed out    Sulfa (Sulfonamide Antibiotics) Swelling     swelling in throat, hives    Tape [Adhesive] Rash     bandaids       Home Medications:  Prior to Admission medications    Medication Sig Start Date End Date Taking? Authorizing Provider   carvediloL (COREG) 6.25 mg tablet Take 6.25 mg by mouth daily. Take / use AM day of surgery  per anesthesia protocols. Yes Provider, Historical   spironolactone (ALDACTONE) 100 mg tablet Take 100 mg by mouth daily. Yes Provider, Historical   losartan (COZAAR) 50 mg tablet Take 50 mg by mouth daily. Yes Provider, Historical   donepeziL (ARICEPT) 5 mg tablet Take 5 mg by mouth nightly. Yes Provider, Historical   ferrous sulfate (IRON) 325 mg (65 mg iron) tablet Take  by mouth Daily (before breakfast). Takes 3 tabs at bedtime   Yes Provider, Historical   insulin NPH (NOVOLIN N, HUMULIN N) 100 unit/mL injection 30 Units by SubCUTAneous route two (2) times a day. 11/12/19  Yes Provider, Historical   thyroid, Pork, (ARMOUR) 60 mg tablet Take 60 mg by mouth daily. Take / use AM day of surgery  per anesthesia protocols. 11/12/19  Yes Provider, Historical   fluticasone propionate (FLONASE) 50 mcg/actuation nasal spray 2 Sprays by Nasal route daily. Take / use AM day of surgery  per anesthesia protocols. 11/12/19  Yes Provider, Historical   solifenacin (VESICARE) 5 mg tablet Take 5 mg by mouth daily. Take / use AM day of surgery  per anesthesia protocols. 1/24/20  Yes Provider, Historical   insulin regular (NOVOLIN R REGULAR U-100 INSULN) 100 unit/mL injection 12 Units by SubCUTAneous route two (2) times a day. Takes 12 units at [facility] and an additional dose PRN   Yes Provider, Historical   mirtazapine (REMERON) 15 mg tablet Take 15 mg by mouth nightly. Yes Provider, Historical   dicyclomine (BENTYL) 10 mg capsule Take 10 mg by mouth three (3) times daily as needed. Take / use AM day of surgery  per anesthesia protocols if needed. Yes Provider, Historical   venlafaxine-SR (EFFEXOR-XR) 150 mg capsule Take 150 mg by mouth nightly. Yes Provider, Historical   omeprazole (PRILOSEC) 20 mg capsule Take 20 mg by mouth daily. Take / use AM day of surgery  per anesthesia protocols.    Yes Provider, Historical   simvastatin (ZOCOR) 20 mg tablet Take 20 mg by mouth nightly. Yes Provider, Historical   cholecalciferol (VITAMIN D3) 1,000 unit cap Take 1,000 Units by mouth daily. Provider, Historical   cyanocobalamin, vitamin B-12, (VITAMIN B-12 PO) Take 1 Tab by mouth daily. Provider, Historical   docosahexanoic acid/epa (FISH OIL PO) Take 1 Cap by mouth daily. Provider, Historical   multivitamin (ONE A DAY) tablet Take 1 Tab by mouth daily. Provider, Historical       Hospital Medications:  Current Facility-Administered Medications   Medication Dose Route Frequency    lactated Ringers infusion  100 mL/hr IntraVENous CONTINUOUS       Social History:  Social History     Tobacco Use    Smoking status: Never Smoker    Smokeless tobacco: Never Used   Substance Use Topics    Alcohol use: No         Family History:  Family History   Problem Relation Age of Onset    Alzheimer Mother     Other Father          of ruptured AAA    Heart defect Sister          of MI age 68    Other Sister         Qu[place] disease-2 sisters with it    Alcohol abuse Brother     Diabetes Sister     Colon Cancer Sister     Colon Cancer Brother     Stroke Sister     Dementia Sister     Sudden Death Other        Review of Systems:  A detailed 10 system ROS is obtained, with pertinent positives as listed above. All others are negative. Objective:     Physical Exam:  Vitals:  Visit Vitals  /79   Pulse 67   Temp 98.3 °F (36.8 °C)   Resp 16   SpO2 97%   Breastfeeding No     Gen:  Pt is alert, cooperative, no acute distress  Skin: no large lesions  HEENT: MMM  Cardiovascular: Regular rate and rhythm. No murmurs, gallops, or rubs. Respiratory:  Comfortable breathing with no accessory muscle use. Clear breath sounds with no wheezes, rales, or rhonchi. GI:  Abdomen nondistended, soft, and nontender. Normal active bowel sounds. Neurological:  Gross memory appears intact.   Patient is alert and oriented. Psychiatric:  Mood appears appropriate with judgement intact. Laboratory:    No results for input(s): WBC, HGB, HCT, PLT, MCV, NA, K, CL, CO2, BUN, CREA, CA, MG, GLU, AP, SGOT, GPT, TBIL, CBIL, ALB, TP, AML, LPSE, PTP, INR, APTT, HGBEXT, HCTEXT, PLTEXT, INREXT in the last 72 hours. No lab exists for component: DBIL    Assessment:       Active Problems:    * No active hospital problems. *      Plan:     Endoscopy and risks explained to the patient. Risks including reaction to sedation, cardiopulmonary events, infection, bleeding, perforation, requirement for surgery if complications should occur, death were explained to patient who expressed understanding and agreed to proceed with endoscopy.         Claribel Hurt MD  [facility], [place]

## 2020-02-21 NOTE — ROUTINE PROCESS
MD to bedside to discuss findings with the pt and her , this writer reviewed instructions with the pt and her  but also the daughter Mena Gerber via telephone conversation with pt's permission, written instructions handed to the pt's daughter upon discharge, the pt's daughter was transporting the pt and her  home via personal car, the pt was discharged via wheelchair, safety was maintained at all times, vss

## 2020-02-21 NOTE — ANESTHESIA POSTPROCEDURE EVALUATION
Procedure(s):  ESOPHAGOGASTRODUODENOSCOPY (EGD)/ 36  ESOPHAGOGASTRODUODENAL (EGD) BIOPSY  ENDOSCOPIC BANDING OR LIGATION. total IV anesthesia    Anesthesia Post Evaluation      Multimodal analgesia: multimodal analgesia used between 6 hours prior to anesthesia start to PACU discharge  Patient location during evaluation: bedside  Patient participation: complete - patient participated  Level of consciousness: awake and alert  Pain score: 3  Pain management: adequate  Airway patency: patent  Anesthetic complications: no  Cardiovascular status: acceptable and hemodynamically stable  Respiratory status: acceptable  Hydration status: acceptable  Post anesthesia nausea and vomiting:  none      Vitals Value Taken Time   BP 95/41 2/21/2020  1:14 PM   Temp     Pulse 75 2/21/2020  1:15 PM   Resp 20 2/21/2020  1:14 PM   SpO2 97 % 2/21/2020  1:15 PM   Vitals shown include unvalidated device data.

## 2020-02-21 NOTE — DISCHARGE INSTRUCTIONS
Gastrointestinal Esophagogastroduodenoscopy (EGD) - Upper Exam Discharge Instructions    1. Call Dr. Gela Parkinson at [phone] for any problems or questions. 2. Contact the doctor's office for follow up appointment as directed. 3. Medication may cause drowsiness for several hours, therefore:  · Do not drive or operate machinery for remainder of the day. · No alcohol today. · Do not make any important or legal decisions for 24 hours. · Do not sign any legal documents for 24 hours. 5. Ordinarily, you may resume regular diet and activity after exam unless otherwise specified by your physician. 6. For mild soreness in your throat you may use Cepacol throat lozenges or warm salt-water gargles as needed. Any additional instructions:      1. Omeprazole 40mg twice daily  2. Repeat EGD 3-4 months  3. Avoid NSAIDs-aspirin, Goody powders, Advil, Ibuprofen, Aleve, Naproxen  4. Follow up in the office in 1-2 months.        Instructions given to Julio Fidelina and other family members.

## 2020-10-26 ENCOUNTER — ANESTHESIA EVENT (OUTPATIENT)
Dept: ENDOSCOPY | Age: 70
End: 2020-10-26
Payer: MEDICARE

## 2020-10-26 NOTE — PROGRESS NOTES
Confirmed appointment with patient's family member via phone. Patient understands arrival time and procedure time. Ride will be present. No other concerns noted at this time.

## 2020-10-27 ENCOUNTER — HOSPITAL ENCOUNTER (OUTPATIENT)
Age: 70
Setting detail: OUTPATIENT SURGERY
Discharge: HOME | End: 2020-10-27
Attending: INTERNAL MEDICINE | Admitting: INTERNAL MEDICINE
Payer: MEDICARE

## 2020-10-27 ENCOUNTER — ANESTHESIA (OUTPATIENT)
Dept: ENDOSCOPY | Age: 70
End: 2020-10-27
Payer: MEDICARE

## 2020-10-27 VITALS
HEIGHT: 60 IN | RESPIRATION RATE: 18 BRPM | SYSTOLIC BLOOD PRESSURE: 135 MMHG | WEIGHT: 184 LBS | OXYGEN SATURATION: 98 % | DIASTOLIC BLOOD PRESSURE: 62 MMHG | BODY MASS INDEX: 36.12 KG/M2 | HEART RATE: 71 BPM | TEMPERATURE: 98 F

## 2020-10-27 LAB — GLUCOSE BLD STRIP.AUTO-MCNC: 175 MG/DL (ref 65–100)

## 2020-10-27 PROCEDURE — 74011250636 HC RX REV CODE- 250/636: Performed by: NURSE ANESTHETIST, CERTIFIED REGISTERED

## 2020-10-27 PROCEDURE — 76060000031 HC ANESTHESIA FIRST 0.5 HR: Performed by: INTERNAL MEDICINE

## 2020-10-27 PROCEDURE — 74011250636 HC RX REV CODE- 250/636: Performed by: STUDENT IN AN ORGANIZED HEALTH CARE EDUCATION/TRAINING PROGRAM

## 2020-10-27 PROCEDURE — 77030021593 HC FCPS BIOP ENDOSC BSC -A: Performed by: INTERNAL MEDICINE

## 2020-10-27 PROCEDURE — 77030014243 HC BND LIG VRCES BSC -D: Performed by: INTERNAL MEDICINE

## 2020-10-27 PROCEDURE — 88305 TISSUE EXAM BY PATHOLOGIST: CPT

## 2020-10-27 PROCEDURE — 88312 SPECIAL STAINS GROUP 1: CPT

## 2020-10-27 PROCEDURE — 82962 GLUCOSE BLOOD TEST: CPT

## 2020-10-27 PROCEDURE — 2709999900 HC NON-CHARGEABLE SUPPLY: Performed by: INTERNAL MEDICINE

## 2020-10-27 PROCEDURE — 76040000025: Performed by: INTERNAL MEDICINE

## 2020-10-27 RX ORDER — LIDOCAINE HYDROCHLORIDE 10 MG/ML
0.1 INJECTION INFILTRATION; PERINEURAL AS NEEDED
Status: DISCONTINUED | OUTPATIENT
Start: 2020-10-27 | End: 2020-10-27 | Stop reason: HOSPADM

## 2020-10-27 RX ORDER — FLUMAZENIL 0.1 MG/ML
0.2 INJECTION INTRAVENOUS
Status: DISCONTINUED | OUTPATIENT
Start: 2020-10-27 | End: 2020-10-27 | Stop reason: HOSPADM

## 2020-10-27 RX ORDER — SODIUM CHLORIDE 0.9 % (FLUSH) 0.9 %
5-40 SYRINGE (ML) INJECTION EVERY 8 HOURS
Status: DISCONTINUED | OUTPATIENT
Start: 2020-10-27 | End: 2020-10-27 | Stop reason: HOSPADM

## 2020-10-27 RX ORDER — SODIUM CHLORIDE, SODIUM LACTATE, POTASSIUM CHLORIDE, CALCIUM CHLORIDE 600; 310; 30; 20 MG/100ML; MG/100ML; MG/100ML; MG/100ML
100 INJECTION, SOLUTION INTRAVENOUS CONTINUOUS
Status: DISCONTINUED | OUTPATIENT
Start: 2020-10-27 | End: 2020-10-27 | Stop reason: HOSPADM

## 2020-10-27 RX ORDER — NALOXONE HYDROCHLORIDE 0.4 MG/ML
0.1 INJECTION, SOLUTION INTRAMUSCULAR; INTRAVENOUS; SUBCUTANEOUS AS NEEDED
Status: DISCONTINUED | OUTPATIENT
Start: 2020-10-27 | End: 2020-10-27 | Stop reason: HOSPADM

## 2020-10-27 RX ORDER — SODIUM CHLORIDE 0.9 % (FLUSH) 0.9 %
5-40 SYRINGE (ML) INJECTION AS NEEDED
Status: DISCONTINUED | OUTPATIENT
Start: 2020-10-27 | End: 2020-10-27 | Stop reason: HOSPADM

## 2020-10-27 RX ORDER — PROPOFOL 10 MG/ML
INJECTION, EMULSION INTRAVENOUS AS NEEDED
Status: DISCONTINUED | OUTPATIENT
Start: 2020-10-27 | End: 2020-10-27 | Stop reason: HOSPADM

## 2020-10-27 RX ORDER — DIPHENHYDRAMINE HYDROCHLORIDE 50 MG/ML
12.5 INJECTION, SOLUTION INTRAMUSCULAR; INTRAVENOUS
Status: DISCONTINUED | OUTPATIENT
Start: 2020-10-27 | End: 2020-10-27 | Stop reason: HOSPADM

## 2020-10-27 RX ADMIN — PROPOFOL 50 MG: 10 INJECTION, EMULSION INTRAVENOUS at 08:46

## 2020-10-27 RX ADMIN — PROPOFOL 50 MG: 10 INJECTION, EMULSION INTRAVENOUS at 08:36

## 2020-10-27 RX ADMIN — SODIUM CHLORIDE, SODIUM LACTATE, POTASSIUM CHLORIDE, AND CALCIUM CHLORIDE 100 ML/HR: 600; 310; 30; 20 INJECTION, SOLUTION INTRAVENOUS at 07:51

## 2020-10-27 RX ADMIN — PROPOFOL 30 MG: 10 INJECTION, EMULSION INTRAVENOUS at 08:38

## 2020-10-27 RX ADMIN — PROPOFOL 30 MG: 10 INJECTION, EMULSION INTRAVENOUS at 08:42

## 2020-10-27 NOTE — ANESTHESIA PREPROCEDURE EVALUATION
Anesthetic History   No history of anesthetic complications            Review of Systems / Medical History  Patient summary reviewed and pertinent labs reviewed    Pulmonary  Within defined limits                 Neuro/Psych         Psychiatric history  Pertinent negatives: Dementia: mild. Cardiovascular    Hypertension: well controlled  Valvular problems/murmurs: mitral insufficiency            Exercise tolerance: >4 METS  Comments: TTE Nov 2019  · The left ventricular systolic function is normal (55-65%). · Grade I (mild) left ventricular diastolic dysfunction present,   consistent with impaired relaxation. · The right ventricular systolic function is normal.  · The left atrium is mildly dilated. · The right atrium is mildly dilated. · Mild-to-moderate mitral valve regurgitation. The jet is posterior   directed.    GI/Hepatic/Renal     GERD: well controlled      Liver disease (Cirrhosis)     Endo/Other    Diabetes: well controlled, type 2, using insulin  Hypothyroidism: well controlled  Obesity, arthritis and anemia     Other Findings            Physical Exam    Airway  Mallampati: III  TM Distance: 4 - 6 cm  Neck ROM: normal range of motion   Mouth opening: Normal     Cardiovascular  Regular rate and rhythm,  S1 and S2 normal,  no murmur, click, rub, or gallop  Rhythm: regular  Rate: normal         Dental    Dentition: Caps/crowns  Comments: Several missing in top back   Pulmonary  Breath sounds clear to auscultation               Abdominal  GI exam deferred       Other Findings            Anesthetic Plan    ASA: 3  Anesthesia type: total IV anesthesia          Induction: Intravenous  Anesthetic plan and risks discussed with: Patient

## 2020-10-27 NOTE — DISCHARGE INSTRUCTIONS
Gastrointestinal Esophagogastroduodenoscopy (EGD) - Upper Exam Discharge Instructions    1. Call Dr. Cong Hoffman at [phone] for any problems or questions. 2. Contact the doctor's office for follow up appointment as directed. 3. Medication may cause drowsiness for several hours, therefore:  · Do not drive or operate machinery for remainder of the day. · No alcohol today. · Do not make any important or legal decisions for 24 hours. · Do not sign any legal documents for 24 hours. 5. Ordinarily, you may resume regular diet and activity after exam unless otherwise              specified by your physician. 6. For mild soreness in your throat you may use Cepacol throat lozenges or warm               salt-water gargles as needed. Any additional instructions:    1. Follow up in office. Instructions given to Ezelovely Mc and other family members.

## 2020-10-27 NOTE — ANESTHESIA POSTPROCEDURE EVALUATION
Procedure(s):  ESOPHAGOGASTRODUODENOSCOPY (EGD)/ 34  ESOPHAGOGASTRODUODENAL (EGD) BIOPSY  ENDOSCOPIC BANDING OR LIGATION.     total IV anesthesia    Anesthesia Post Evaluation      Multimodal analgesia: multimodal analgesia used between 6 hours prior to anesthesia start to PACU discharge  Patient location during evaluation: bedside  Patient participation: complete - patient participated  Level of consciousness: awake and alert  Pain management: adequate  Airway patency: patent  Anesthetic complications: no  Cardiovascular status: acceptable  Respiratory status: acceptable  Hydration status: acceptable  Post anesthesia nausea and vomiting:  controlled  Final Post Anesthesia Temperature Assessment:  Normothermia (36.0-37.5 degrees C)      INITIAL Post-op Vital signs:   Vitals Value Taken Time   /62 10/27/2020  9:23 AM   Temp 36.7 °C (98 °F) 10/27/2020  8:55 AM   Pulse 71 10/27/2020  9:23 AM   Resp 18 10/27/2020  9:23 AM   SpO2 98 % 10/27/2020  9:23 AM

## 2020-10-27 NOTE — PROCEDURES
Esophagogastroduodenoscopy    DATE of PROCEDURE: 10/27/2020    INDICATIONS:  1. Portal hypertension  2. History of esophageal varices     MEDICATIONS ADMINISTERED: MAC    INSTRUMENT: GIF    PROCEDURE:  After obtaining informed consent, the patient was placed in the left lateral position and sedated. The endoscope was advanced under direct vision without difficulty. The esophagus, stomach (including retroflexed views) and duodenum were evaluated. The patient was taken to the recovery area in stable condition. FINDINGS:  ESOPHAGUS: Medium size esophageal varices near the GE junction. 2 ligation bands successfully placed. STOMACH: Portal hypertensive gastropathy and gastritis present. Four random biopsies taken from the antrum, incisura and body of the stomach. DUODENUM: Normal     Estimated blood loss: 0-minimal     PLAN:  1.  Follow up in office     Dahlia Hodges MD  [facility], [place]

## 2020-10-27 NOTE — H&P
History and Physical    [facility] H&P (Admission)        Date:  10/27/2020    Chief Complaint:  Portal HTN    Subjective:     History of Present Illness:  Patient is a 69 y.o. being admitted for EGD. PMH:  Past Medical History:   Diagnosis Date    Anemia     iron supplement daily    Arthritis     osteo    Cancer ([facility] [address]     left breast    Chronic pain     left knee    Cirrhosis ([facility]     Dementia ([facility] [facility]     Diabetes ([facility] [address]     Type 2; checks bs tid; average bs 150; A1C=7.0 on 2020    Diverticulosis     Epistaxis     GERD (gastroesophageal reflux disease)     controlled with medication    GI bleed     Hypercholesteremia     on med     Hypertension     controlled with medication    Morbid obesity ([address] 2014    BMI=34.2    Psychiatric disorder     depression    S/P total knee arthroplasty 2014    Thyroid disease     hypothyroid=on med    Unspecified adverse effect of anesthesia     NO BP or Venipuncture left arm       PSH:  Past Surgical History:   Procedure Laterality Date    HX  SECTION      x1    HX COLECTOMY      HX DILATION AND EVACUATION      HX ENDOSCOPY  2019    HX HYSTERECTOMY      HX KNEE ARTHROSCOPY Left     HX KNEE REPLACEMENT Left     HX MASTECTOMY Left     HX ORTHOPAEDIC Right 2014    carpal tunnel    HX ORTHOPAEDIC Left     ganglion cyst removed from knee    HX ORTHOPAEDIC Left     carpal tunnel    HX ORTHOPAEDIC Left     ganglion cyst wrist       Allergies:   Allergies   Allergen Reactions    Demerol [Meperidine] Other (comments)     Stopped breathing    Dilaudid [Hydromorphone] Other (comments)     hallucinations    Macrodantin [Nitrofurantoin Macrocrystalline] Other (comments)     Shaking, passed out    Sulfa (Sulfonamide Antibiotics) Swelling     swelling in throat, hives    Tape [Adhesive] Rash     bandaids       Home Medications:  Prior to Admission medications    Medication Sig Start Date End Date Taking? Authorizing Provider   carvediloL (COREG) 6.25 mg tablet Take 6.25 mg by mouth daily. Take / use AM day of surgery  per anesthesia protocols. Yes Provider, Historical   spironolactone (ALDACTONE) 100 mg tablet Take 100 mg by mouth daily. Yes Provider, Historical   losartan (COZAAR) 50 mg tablet Take 50 mg by mouth daily. Yes Provider, Historical   donepeziL (ARICEPT) 5 mg tablet Take 5 mg by mouth nightly. Yes Provider, Historical   ferrous sulfate (IRON) 325 mg (65 mg iron) tablet Take  by mouth Daily (before breakfast). Takes 3 tabs at bedtime   Yes Provider, Historical   insulin NPH (NOVOLIN N, HUMULIN N) 100 unit/mL injection 40 Units by SubCUTAneous route two (2) times a day. 11/12/19  Yes Provider, Historical   thyroid, Pork, (ARMOUR) 60 mg tablet Take 60 mg by mouth daily. Take / use AM day of surgery  per anesthesia protocols. 11/12/19  Yes Provider, Historical   fluticasone propionate (FLONASE) 50 mcg/actuation nasal spray 2 Sprays by Nasal route daily. Take / use AM day of surgery  per anesthesia protocols. 11/12/19  Yes Provider, Historical   solifenacin (VESICARE) 5 mg tablet Take 5 mg by mouth daily. Take / use AM day of surgery  per anesthesia protocols. 1/24/20  Yes Provider, Historical   insulin regular (NOVOLIN R REGULAR U-100 INSULN) 100 unit/mL injection 16 Units by SubCUTAneous route two (2) times a day. Takes 12 units at [facility] and an additional dose PRN   Yes Provider, Historical   mirtazapine (REMERON) 15 mg tablet Take 15 mg by mouth nightly. Yes Provider, Historical   dicyclomine (BENTYL) 10 mg capsule Take 10 mg by mouth three (3) times daily as needed. Take / use AM day of surgery  per anesthesia protocols if needed. Yes Provider, Historical   venlafaxine-SR (EFFEXOR-XR) 150 mg capsule Take 150 mg by mouth nightly. Yes Provider, Historical   omeprazole (PRILOSEC) 20 mg capsule Take 20 mg by mouth daily.  Take / use AM day of surgery  per anesthesia protocols. Yes Provider, Historical   cholecalciferol (VITAMIN D3) 1,000 unit cap Take 1,000 Units by mouth daily. Yes Provider, Historical   cyanocobalamin, vitamin B-12, (VITAMIN B-12 PO) Take 1 Tab by mouth daily. Yes Provider, Historical   docosahexanoic acid/epa (FISH OIL PO) Take 1 Cap by mouth daily. Yes Provider, Historical   simvastatin (ZOCOR) 20 mg tablet Take 20 mg by mouth nightly. Yes Provider, Historical   multivitamin (ONE A DAY) tablet Take 1 Tab by mouth daily.    Yes Provider, Historical       Hospital Medications:  Current Facility-Administered Medications   Medication Dose Route Frequency    lidocaine (XYLOCAINE) 10 mg/mL (1 %) injection 0.1 mL  0.1 mL SubCUTAneous PRN    lactated Ringers infusion  100 mL/hr IntraVENous CONTINUOUS    sodium chloride (NS) flush 5-40 mL  5-40 mL IntraVENous Q8H    sodium chloride (NS) flush 5-40 mL  5-40 mL IntraVENous PRN    lactated Ringers infusion  100 mL/hr IntraVENous CONTINUOUS    sodium chloride (NS) flush 5-40 mL  5-40 mL IntraVENous Q8H    sodium chloride (NS) flush 5-40 mL  5-40 mL IntraVENous PRN    naloxone (NARCAN) injection 0.1 mg  0.1 mg IntraVENous PRN    flumazeniL (ROMAZICON) 0.1 mg/mL injection 0.2 mg  0.2 mg IntraVENous Multiple    promethazine (PHENERGAN) with saline injection 6.25 mg  6.25 mg IntraVENous Q15MIN PRN    diphenhydrAMINE (BENADRYL) injection 12.5 mg  12.5 mg IntraVENous Q15MIN PRN       Social History:  Social History     Tobacco Use    Smoking status: Never Smoker    Smokeless tobacco: Never Used   Substance Use Topics    Alcohol use: No         Family History:  Family History   Problem Relation Age of Onset    Alzheimer Mother     Other Father          of ruptured AAA    Heart defect Sister          of MI age 68    Other Sister         Sathya Eaves disease-2 sisters with it    Alcohol abuse Brother     Diabetes Sister     Colon Cancer Sister     Colon Cancer Brother     Stroke Sister    [facility] Dementia Sister     Sudden Death Other        Review of Systems:  A detailed 10 system ROS is obtained, with pertinent positives as listed above. All others are negative. Objective:     Physical Exam:  Vitals:  Visit Vitals  BP (!) 141/65   Pulse 69   Temp 98.2 °F (36.8 °C)   Resp 18   Ht 5' (1.524 m)   Wt 83.5 kg (184 lb)   SpO2 96%   Breastfeeding No   BMI 35.94 kg/m²     Gen:  Pt is alert, cooperative, no acute distress  Skin: no large lesions  HEENT: MMM  Cardiovascular: Regular rate and rhythm. No murmurs, gallops, or rubs. Respiratory:  Comfortable breathing with no accessory muscle use. Clear breath sounds with no wheezes, rales, or rhonchi. GI:  Abdomen nondistended, soft, and nontender. Normal active bowel sounds. Neurological:  Gross memory appears intact. Patient is alert and oriented. Psychiatric:  Mood appears appropriate with judgement intact. Laboratory:    No results for input(s): WBC, HGB, HCT, PLT, MCV, NA, K, CL, CO2, BUN, CREA, CA, MG, GLU, AP, TBIL, CBIL, ALB, TP, AML, LPSE, PTP, INR, APTT, HGBEXT, HCTEXT, PLTEXT, INREXT in the last 72 hours. No lab exists for component: SGOT, GPT, DBIL    Assessment:       Active Problems:    * No active hospital problems. *      Plan:     Endoscopy and risks explained to the patient. Risks including reaction to sedation, cardiopulmonary events, infection, bleeding, perforation, requirement for surgery if complications should occur, death were explained to patient who expressed understanding and agreed to proceed with endoscopy.         Tomasz Menchaca MD  [facility], [place]

## 2021-05-12 ENCOUNTER — ANESTHESIA EVENT (OUTPATIENT)
Dept: ENDOSCOPY | Age: 71
End: 2021-05-12
Payer: MEDICARE

## 2021-05-12 ENCOUNTER — HOSPITAL ENCOUNTER (OUTPATIENT)
Age: 71
Setting detail: OUTPATIENT SURGERY
Discharge: HOME | End: 2021-05-12
Attending: INTERNAL MEDICINE | Admitting: INTERNAL MEDICINE
Payer: MEDICARE

## 2021-05-12 ENCOUNTER — ANESTHESIA (OUTPATIENT)
Dept: ENDOSCOPY | Age: 71
End: 2021-05-12
Payer: MEDICARE

## 2021-05-12 VITALS
TEMPERATURE: 98 F | OXYGEN SATURATION: 98 % | HEART RATE: 59 BPM | DIASTOLIC BLOOD PRESSURE: 66 MMHG | RESPIRATION RATE: 16 BRPM | SYSTOLIC BLOOD PRESSURE: 147 MMHG

## 2021-05-12 LAB
GLUCOSE BLD STRIP.AUTO-MCNC: 88 MG/DL (ref 65–100)
SERVICE CMNT-IMP: NORMAL

## 2021-05-12 PROCEDURE — 76060000031 HC ANESTHESIA FIRST 0.5 HR: Performed by: INTERNAL MEDICINE

## 2021-05-12 PROCEDURE — 74011250636 HC RX REV CODE- 250/636: Performed by: ANESTHESIOLOGY

## 2021-05-12 PROCEDURE — 76040000025: Performed by: INTERNAL MEDICINE

## 2021-05-12 PROCEDURE — 74011000250 HC RX REV CODE- 250: Performed by: NURSE ANESTHETIST, CERTIFIED REGISTERED

## 2021-05-12 PROCEDURE — 74011250636 HC RX REV CODE- 250/636: Performed by: NURSE ANESTHETIST, CERTIFIED REGISTERED

## 2021-05-12 PROCEDURE — 82962 GLUCOSE BLOOD TEST: CPT

## 2021-05-12 PROCEDURE — 77030022875 HC PRB AR PLSM COAG ERBE -C: Performed by: INTERNAL MEDICINE

## 2021-05-12 PROCEDURE — 2709999900 HC NON-CHARGEABLE SUPPLY: Performed by: INTERNAL MEDICINE

## 2021-05-12 RX ORDER — SODIUM CHLORIDE, SODIUM LACTATE, POTASSIUM CHLORIDE, CALCIUM CHLORIDE 600; 310; 30; 20 MG/100ML; MG/100ML; MG/100ML; MG/100ML
1000 INJECTION, SOLUTION INTRAVENOUS CONTINUOUS
Status: DISCONTINUED | OUTPATIENT
Start: 2021-05-12 | End: 2021-05-12 | Stop reason: HOSPADM

## 2021-05-12 RX ORDER — PROPOFOL 10 MG/ML
INJECTION, EMULSION INTRAVENOUS
Status: DISCONTINUED | OUTPATIENT
Start: 2021-05-12 | End: 2021-05-12 | Stop reason: HOSPADM

## 2021-05-12 RX ORDER — LIDOCAINE HYDROCHLORIDE 20 MG/ML
INJECTION, SOLUTION EPIDURAL; INFILTRATION; INTRACAUDAL; PERINEURAL AS NEEDED
Status: DISCONTINUED | OUTPATIENT
Start: 2021-05-12 | End: 2021-05-12 | Stop reason: HOSPADM

## 2021-05-12 RX ORDER — PROPOFOL 10 MG/ML
INJECTION, EMULSION INTRAVENOUS AS NEEDED
Status: DISCONTINUED | OUTPATIENT
Start: 2021-05-12 | End: 2021-05-12 | Stop reason: HOSPADM

## 2021-05-12 RX ADMIN — PROPOFOL 60 MG: 10 INJECTION, EMULSION INTRAVENOUS at 07:00

## 2021-05-12 RX ADMIN — LIDOCAINE HYDROCHLORIDE 100 MG: 20 INJECTION, SOLUTION EPIDURAL; INFILTRATION; INTRACAUDAL; PERINEURAL at 07:00

## 2021-05-12 RX ADMIN — PROPOFOL 200 MCG/KG/MIN: 10 INJECTION, EMULSION INTRAVENOUS at 07:00

## 2021-05-12 RX ADMIN — SODIUM CHLORIDE, SODIUM LACTATE, POTASSIUM CHLORIDE, AND CALCIUM CHLORIDE 1000 ML: 600; 310; 30; 20 INJECTION, SOLUTION INTRAVENOUS at 06:39

## 2021-05-12 RX ADMIN — PROPOFOL 20 MG: 10 INJECTION, EMULSION INTRAVENOUS at 07:01

## 2021-05-12 NOTE — H&P
[facility] H&P (Admission)        Date:  2021    Chief Complaint:  Portal HTN    Subjective:     History of Present Illness:  Patient is a 66 y.o. being admitted for EGD. PMH:  Past Medical History:   Diagnosis Date    Anemia     iron supplement daily    Arthritis     osteo    Cancer ([address]     left breast    Chronic pain     left knee    Cirrhosis ([address]     Dementia ([address]     Diabetes ([address]     Type 2; checks bs tid; average bs 105; A1C=~8 per patient; hypo sx at 40's- 52's     Diverticulosis     Epistaxis     GERD (gastroesophageal reflux disease)     controlled with medication    GI bleed     History of COVID-19     states was diagnosed in Dec. 2020 but tested positive through 2021; states was hospitalized at the time for a fall but not for Covid and reports that symptoms were mild    Hypercholesteremia     on med     Hypertension     controlled with medication    Morbid obesity ([address] 2014    BMI=34.2    Psychiatric disorder     depression    PUD (peptic ulcer disease)     ~    S/P total knee arthroplasty 2014    Thyroid disease     hypothyroid=on med    Unspecified adverse effect of anesthesia     NO BP or Venipuncture left arm       PSH:  Past Surgical History:   Procedure Laterality Date    HX  SECTION      x1    HX DILATION AND EVACUATION      HX ENDOSCOPY  2019    HX HYSTERECTOMY      HX KNEE ARTHROSCOPY Left     HX KNEE REPLACEMENT Left     HX MASTECTOMY Left     HX ORTHOPAEDIC Right 2014    carpal tunnel    HX ORTHOPAEDIC Left     ganglion cyst removed from knee    HX ORTHOPAEDIC Left     carpal tunnel    HX ORTHOPAEDIC Left     ganglion cyst wrist    HX TOTAL COLECTOMY         Allergies:   Allergies   Allergen Reactions    Demerol [Meperidine] Other (comments)     Stopped breathing    Dilaudid [Hydromorphone] Other (comments)     hallucinations    Macrodantin [Nitrofurantoin Macrocrystalline] Other (comments)     Shaking, passed out    Sulfa (Sulfonamide Antibiotics) Swelling     swelling in throat, hives    Tape [Adhesive] Rash     bandaids       Home Medications:  Prior to Admission medications    Medication Sig Start Date End Date Taking? Authorizing Provider   carvediloL (COREG) 6.25 mg tablet Take 6.25 mg by mouth daily. Take / use AM day of surgery  per anesthesia protocols. Yes Provider, Historical   spironolactone (ALDACTONE) 100 mg tablet Take 100 mg by mouth daily. Do not take morning of procedure per anesthesia protocol. Yes Provider, Historical   losartan (COZAAR) 50 mg tablet Take 50 mg by mouth daily. Do not take morning of procedure per anesthesia protocol. Yes Provider, Historical   donepeziL (ARICEPT) 5 mg tablet Take 5 mg by mouth nightly. Start holding now for procedure   Yes Provider, Historical   ferrous sulfate (IRON) 325 mg (65 mg iron) tablet Take  by mouth Daily (before breakfast). Takes 3 tabs at bedtime   Yes Provider, Historical   insulin NPH (NOVOLIN N, HUMULIN N) 100 unit/mL injection 45 Units by SubCUTAneous route daily. NPH and Mixed Insulin instructions:    Day before procedure (5/11/21): Morning- 80% of usual dose=  36 units      Morning of procedure (5/12/21): If blood sugar is over 120 take 50% of usual dose= 22 units. Do not take insulin if blood glucose is <120 on morning of procedure. Per anesthesia protocol: If you feel symptoms of low blood sugar while fasting, check level. If low, drink only 4 ounces of a clear, sugary liquid and call pre-op at [phone]. 11/12/19  Yes Provider, Historical   thyroid, Pork, (ARMOUR) 60 mg tablet Take 60 mg by mouth daily. Take / use AM day of surgery  per anesthesia protocols. 11/12/19  Yes Provider, Historical   fluticasone propionate (FLONASE) 50 mcg/actuation nasal spray 2 Sprays by Nasal route daily. Take / use AM day of surgery  per anesthesia protocols.  11/12/19  Yes Provider, Historical   solifenacin (VESICARE) 5 mg tablet Take 5 mg by mouth daily. Take / use AM day of surgery  per anesthesia protocols. 20  Yes Provider, Historical   insulin regular (NOVOLIN R REGULAR U-100 INSULN) 100 unit/mL injection 4 Units by SubCUTAneous route two (2) times a day. 4 units if >150  Do not take morning of procedure per anesthesia protocol. Yes Provider, Historical   mirtazapine (REMERON) 15 mg tablet Take 15 mg by mouth nightly. Yes Provider, Historical   dicyclomine (BENTYL) 10 mg capsule Take 10 mg by mouth three (3) times daily as needed. Take / use AM day of surgery  per anesthesia protocols if needed. Yes Provider, Historical   venlafaxine-SR (EFFEXOR-XR) 150 mg capsule Take 150 mg by mouth nightly. Yes Provider, Historical   omeprazole (PRILOSEC) 20 mg capsule Take 20 mg by mouth daily. Take / use AM day of surgery  per anesthesia protocols. Yes Provider, Historical   cholecalciferol (VITAMIN D3) 1,000 unit cap Take 1,000 Units by mouth daily. Yes Provider, Historical   cyanocobalamin, vitamin B-12, (VITAMIN B-12 PO) Take 1 Tab by mouth daily. Yes Provider, Historical   docosahexanoic acid/epa (FISH OIL PO) Take 1 Cap by mouth daily. Yes Provider, Historical   simvastatin (ZOCOR) 20 mg tablet Take 20 mg by mouth nightly. Yes Provider, Historical   multivitamin (ONE A DAY) tablet Take 1 Tab by mouth daily.    Yes Provider, Historical       Hospital Medications:  Current Facility-Administered Medications   Medication Dose Route Frequency    lactated Ringers infusion 1,000 mL  1,000 mL IntraVENous CONTINUOUS       Social History:  Social History     Tobacco Use    Smoking status: Never Smoker    Smokeless tobacco: Never Used   Substance Use Topics    Alcohol use: No         Family History:  Family History   Problem Relation Age of Onset    Alzheimer Mother     Other Father          of ruptured AAA    Heart defect Sister          of MI age 68    Other Sister Rima Bullion disease-2 sisters with it    Alcohol abuse Brother     Diabetes Sister     Colon Cancer Sister     Colon Cancer Brother     Stroke Sister     Dementia Sister     Sudden Death Other        Review of Systems:  A detailed 10 system ROS is obtained, with pertinent positives as listed above. All others are negative. Objective:     Physical Exam:  Vitals:  Visit Vitals  BP (!) 136/58   Pulse 66   Temp 98.6 °F (37 °C)   Resp 18   SpO2 100%   Breastfeeding No     Gen:  Pt is alert, cooperative, no acute distress  Skin: no large lesions  HEENT: MMM  Cardiovascular: Regular rate and rhythm. No murmurs, gallops, or rubs. Respiratory:  Comfortable breathing with no accessory muscle use. Clear breath sounds with no wheezes, rales, or rhonchi. GI:  Abdomen nondistended, soft, and nontender. Normal active bowel sounds. Neurological:  Gross memory appears intact. Patient is alert and oriented. Psychiatric:  Mood appears appropriate with judgement intact. Laboratory:    No results for input(s): WBC, HGB, HCT, PLT, MCV, NA, K, CL, CO2, BUN, CREA, CA, MG, GLU, AP, TBIL, CBIL, ALB, TP, AML, LPSE, PTP, INR, APTT, HGBEXT, HCTEXT, PLTEXT, INREXT in the last 72 hours. No lab exists for component: SGOT, GPT, DBIL    Assessment:       Active Problems:    * No active hospital problems. *      Plan:     Endoscopy and risks explained to the patient. Risks including reaction to sedation, cardiopulmonary events, infection, bleeding, perforation, requirement for surgery if complications should occur, death were explained to patient who expressed understanding and agreed to proceed with endoscopy.         Khanh Alfonso MD  [facility], [place]

## 2021-05-12 NOTE — PROCEDURES
Esophagogastroduodenoscopy    DATE of PROCEDURE: 5/12/2021    INDICATIONS:  1. Portal HTN    MEDICATIONS ADMINISTERED: MAC    INSTRUMENT: GIF    PROCEDURE:  After obtaining informed consent, the patient was placed in the left lateral position and sedated. The endoscope was advanced under direct vision without difficulty. The esophagus, stomach (including retroflexed views) and duodenum were evaluated. The patient was taken to the recovery area in stable condition. FINDINGS:  ESOPHAGUS: small esophageal varices without high risk stigmata that flatten easily with insufflation. Old banding scars present. 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 DUODENUM: normal    Estimated blood loss: 0-minimal     PLAN:  1. Titrate beta blocker to HR 55-60 bpm  2. Repeat EGD about 4 months  3.  Follow up in the office    Jose Juan Milton MD  [facility], [place]

## 2021-05-12 NOTE — DISCHARGE INSTRUCTIONS
Gastrointestinal Esophagogastroduodenoscopy (EGD)/ Endoscopic Ultrasound(EUS)- Upper Exam Discharge Instructions    1. Call Dr. Taylor Boyce  for any problems or questions. 2. Contact the doctor's office for follow up appointment as directed. 3. Medication may cause drowsiness for several hours, therefore, do not drive or operate machinery for remainder of the day. 4. No alcohol today. 5. Do not make any important decisions such as signing legal paperwork. 6. Ordinarily, you may resume regular diet and activity after exam unless otherwise specified by your physician. 7. For mild soreness in your throat you may use Cepacol throat lozenges or warm  salt-water gargles as needed. Any additional instructions:   Repeat EGD in 4 months   Follow-up in office         Instructions given to Kailee Oscar and other family members.

## 2021-05-12 NOTE — ANESTHESIA POSTPROCEDURE EVALUATION
Procedure(s):  ESOPHAGOGASTRODUODENOSCOPY (EGD)/ BMI 34  ENDOSCOPIC ARGON PLASMA COAGULATION. total IV anesthesia    Anesthesia Post Evaluation      Multimodal analgesia: multimodal analgesia used between 6 hours prior to anesthesia start to PACU discharge  Patient location during evaluation: PACU  Patient participation: complete - patient participated  Level of consciousness: awake  Pain management: adequate  Airway patency: patent  Anesthetic complications: no  Cardiovascular status: acceptable  Respiratory status: acceptable  Hydration status: acceptable  Post anesthesia nausea and vomiting:  none  Final Post Anesthesia Temperature Assessment:  Normothermia (36.0-37.5 degrees C)      INITIAL Post-op Vital signs:   Vitals Value Taken Time   /57 05/12/21 0722   Temp 36.7 °C (98 °F) 05/12/21 0712   Pulse 59 05/12/21 0731   Resp 18 05/12/21 0722   SpO2 97 % 05/12/21 0731   Vitals shown include unvalidated device data.

## 2021-05-12 NOTE — ANESTHESIA PREPROCEDURE EVALUATION
Anesthetic History   No history of anesthetic complications            Review of Systems / Medical History  Patient summary reviewed and pertinent labs reviewed    Pulmonary  Within defined limits              Comments: COVID + 12/20 to early 3/21- very mild to NO symptoms   Neuro/Psych         Psychiatric history  Pertinent negatives: Dementia: mild.    Cardiovascular    Hypertension: well controlled  Valvular problems/murmurs: mitral insufficiency            Exercise tolerance: >4 METS  Comments: 3/21 echo Hyperdynamic LV FX  Mild MR                                                         GI/Hepatic/Renal     GERD: well controlled      Liver disease (Cirrhosis)     Endo/Other    Diabetes: well controlled, type 2, using insulin  Hypothyroidism: well controlled  Obesity, arthritis and anemia     Other Findings              Physical Exam    Airway  Mallampati: III  TM Distance: 4 - 6 cm  Neck ROM: normal range of motion   Mouth opening: Normal     Cardiovascular  Regular rate and rhythm,  S1 and S2 normal,  no murmur, click, rub, or gallop  Rhythm: regular  Rate: normal         Dental    Dentition: Caps/crowns  Comments: Several missing in top back   Pulmonary  Breath sounds clear to auscultation               Abdominal  GI exam deferred       Other Findings            Anesthetic Plan    ASA: 3  Anesthesia type: total IV anesthesia          Induction: Intravenous  Anesthetic plan and risks discussed with: Patient

## 2021-07-21 ENCOUNTER — TRANSCRIBE ORDER (OUTPATIENT)
Dept: SCHEDULING | Age: 71
End: 2021-07-21

## 2021-07-21 DIAGNOSIS — K72.90 HEPATIC FAILURE (HCC): Primary | ICD-10-CM

## 2021-07-21 DIAGNOSIS — K76.0 FATTY LIVER: ICD-10-CM

## 2021-08-11 ENCOUNTER — HOSPITAL ENCOUNTER (OUTPATIENT)
Dept: MRI IMAGING | Age: 71
Discharge: HOME | End: 2021-08-11
Attending: INTERNAL MEDICINE
Payer: MEDICARE

## 2021-08-11 DIAGNOSIS — K76.0 FATTY LIVER: ICD-10-CM

## 2021-08-11 DIAGNOSIS — K72.90 HEPATIC FAILURE (HCC): ICD-10-CM

## 2021-08-11 PROCEDURE — 74181 MRI ABDOMEN W/O CONTRAST: CPT

## 2022-03-16 NOTE — PROGRESS NOTES
Called and confirmed scheduled procedure for patient. Informed on patients arrival time, entry location, and  policy. Opportunity for questions provided, no voiced concerns.   Explained to arrive at 6am to register at [address]

## 2022-03-17 ENCOUNTER — ANESTHESIA (OUTPATIENT)
Dept: ENDOSCOPY | Age: 72
End: 2022-03-17
Payer: MEDICARE

## 2022-03-17 ENCOUNTER — HOSPITAL ENCOUNTER (OUTPATIENT)
Age: 72
Setting detail: OUTPATIENT SURGERY
Discharge: HOME | End: 2022-03-17
Attending: INTERNAL MEDICINE | Admitting: INTERNAL MEDICINE
Payer: MEDICARE

## 2022-03-17 ENCOUNTER — ANESTHESIA EVENT (OUTPATIENT)
Dept: ENDOSCOPY | Age: 72
End: 2022-03-17
Payer: MEDICARE

## 2022-03-17 VITALS
OXYGEN SATURATION: 95 % | SYSTOLIC BLOOD PRESSURE: 115 MMHG | HEART RATE: 57 BPM | TEMPERATURE: 98.6 F | WEIGHT: 182 LBS | BODY MASS INDEX: 35.73 KG/M2 | DIASTOLIC BLOOD PRESSURE: 52 MMHG | RESPIRATION RATE: 16 BRPM | HEIGHT: 60 IN

## 2022-03-17 LAB
GLUCOSE BLD STRIP.AUTO-MCNC: 116 MG/DL (ref 65–100)
SERVICE CMNT-IMP: ABNORMAL

## 2022-03-17 PROCEDURE — 77030013805 HC LIGTR VRCES BSC -B: Performed by: INTERNAL MEDICINE

## 2022-03-17 PROCEDURE — 74011000250 HC RX REV CODE- 250: Performed by: REGISTERED NURSE

## 2022-03-17 PROCEDURE — 88305 TISSUE EXAM BY PATHOLOGIST: CPT

## 2022-03-17 PROCEDURE — 76040000026: Performed by: INTERNAL MEDICINE

## 2022-03-17 PROCEDURE — 2709999900 HC NON-CHARGEABLE SUPPLY: Performed by: INTERNAL MEDICINE

## 2022-03-17 PROCEDURE — 74011250636 HC RX REV CODE- 250/636: Performed by: ANESTHESIOLOGY

## 2022-03-17 PROCEDURE — 76060000032 HC ANESTHESIA 0.5 TO 1 HR: Performed by: INTERNAL MEDICINE

## 2022-03-17 PROCEDURE — 74011250636 HC RX REV CODE- 250/636: Performed by: REGISTERED NURSE

## 2022-03-17 PROCEDURE — 77030021593 HC FCPS BIOP ENDOSC BSC -A: Performed by: INTERNAL MEDICINE

## 2022-03-17 PROCEDURE — 82962 GLUCOSE BLOOD TEST: CPT

## 2022-03-17 RX ORDER — SODIUM CHLORIDE, SODIUM LACTATE, POTASSIUM CHLORIDE, CALCIUM CHLORIDE 600; 310; 30; 20 MG/100ML; MG/100ML; MG/100ML; MG/100ML
1000 INJECTION, SOLUTION INTRAVENOUS CONTINUOUS
Status: DISCONTINUED | OUTPATIENT
Start: 2022-03-17 | End: 2022-03-17 | Stop reason: HOSPADM

## 2022-03-17 RX ORDER — PROPOFOL 10 MG/ML
INJECTION, EMULSION INTRAVENOUS
Status: DISCONTINUED | OUTPATIENT
Start: 2022-03-17 | End: 2022-03-17 | Stop reason: HOSPADM

## 2022-03-17 RX ORDER — LIDOCAINE HYDROCHLORIDE 20 MG/ML
INJECTION, SOLUTION EPIDURAL; INFILTRATION; INTRACAUDAL; PERINEURAL AS NEEDED
Status: DISCONTINUED | OUTPATIENT
Start: 2022-03-17 | End: 2022-03-17 | Stop reason: HOSPADM

## 2022-03-17 RX ORDER — PROPOFOL 10 MG/ML
INJECTION, EMULSION INTRAVENOUS AS NEEDED
Status: DISCONTINUED | OUTPATIENT
Start: 2022-03-17 | End: 2022-03-17 | Stop reason: HOSPADM

## 2022-03-17 RX ADMIN — PROPOFOL 140 MCG/KG/MIN: 10 INJECTION, EMULSION INTRAVENOUS at 07:05

## 2022-03-17 RX ADMIN — LIDOCAINE HYDROCHLORIDE 100 MG: 20 INJECTION, SOLUTION EPIDURAL; INFILTRATION; INTRACAUDAL; PERINEURAL at 07:05

## 2022-03-17 RX ADMIN — PROPOFOL 40 MG: 10 INJECTION, EMULSION INTRAVENOUS at 07:05

## 2022-03-17 RX ADMIN — SODIUM CHLORIDE, SODIUM LACTATE, POTASSIUM CHLORIDE, AND CALCIUM CHLORIDE 1000 ML: 600; 310; 30; 20 INJECTION, SOLUTION INTRAVENOUS at 06:48

## 2022-03-17 NOTE — PROCEDURES
Esophagogastroduodenoscopy    DATE of PROCEDURE: 3/17/2022    INDICATIONS:  1. Portal HTN    MEDICATIONS ADMINISTERED: MAC    INSTRUMENT: GIF    PROCEDURE:  After obtaining informed consent, the patient was placed in the left lateral position and sedated. The endoscope was advanced under direct vision without difficulty. The esophagus, stomach (including retroflexed views) and duodenum were evaluated. The patient was taken to the recovery area in stable condition. FINDINGS:  ESOPHAGUS: Old scars and 1 large varix. A single band was placed with successful flattening of the varix. STOMACH: Portal hypertensive gastropathy present in the stomach. Small amount of nodular gave was seen in the antrum. No active bleeding was witnessed. DUODENUM: A diverticulum was seen in the second portion of the duodenum. The major papilla is contained within the diverticulum. Proximally in the bulb, there is a polypoid area that was biopsied. Estimated blood loss: 0-minimal     PLAN:  1.  Proceed with colonoscopy    Lisbet Mix MD  [facility] [place], [place]

## 2022-03-17 NOTE — PROGRESS NOTES
's pre-procedure visit and prayer with patient as requested.     Rebecca Mazariegos MDiv, BS  Board Certified

## 2022-03-17 NOTE — ANESTHESIA POSTPROCEDURE EVALUATION
Procedure(s):  ESOPHAGOGASTRODUODENOSCOPY (EGD)  COLONOSCOPY/ 29  ESOPHAGOGASTRODUODENAL (EGD) BIOPSY  ENDOSCOPIC BANDING OR LIGATION. total IV anesthesia    Anesthesia Post Evaluation        Patient location during evaluation: PACU  Patient participation: complete - patient participated  Level of consciousness: awake  Pain management: adequate  Airway patency: patent  Anesthetic complications: no  Cardiovascular status: acceptable  Respiratory status: acceptable  Hydration status: acceptable  Post anesthesia nausea and vomiting:  none      INITIAL Post-op Vital signs:   Vitals Value Taken Time   /52 03/17/22 0820   Temp 37 °C (98.6 °F) 03/17/22 0750   Pulse 64 03/17/22 0823   Resp 16 03/17/22 0820   SpO2 96 % 03/17/22 0823   Vitals shown include unvalidated device data.

## 2022-03-17 NOTE — ANESTHESIA PREPROCEDURE EVALUATION
Anesthetic History   No history of anesthetic complications            Review of Systems / Medical History  Patient summary reviewed and pertinent labs reviewed    Pulmonary  Within defined limits              Comments: COVID + 12/20 to early 3/21- very mild to NO symptoms   Neuro/Psych         Psychiatric history  Pertinent negatives: Dementia: mild.    Cardiovascular    Hypertension: well controlled  Valvular problems/murmurs: mitral insufficiency            Exercise tolerance: >4 METS  Comments: 3/21 echo Hyperdynamic LV FX  Mild MR                                                         GI/Hepatic/Renal     GERD: well controlled      PUD and liver disease (Cirrhosis)     Endo/Other    Diabetes: well controlled, type 2, using insulin  Hypothyroidism: well controlled  Obesity, arthritis and anemia     Other Findings              Physical Exam    Airway  Mallampati: II  TM Distance: 4 - 6 cm  Neck ROM: normal range of motion   Mouth opening: Normal     Cardiovascular  Regular rate and rhythm,  S1 and S2 normal,  no murmur, click, rub, or gallop  Rhythm: regular  Rate: normal         Dental    Dentition: Caps/crowns, Full upper dentures and Lower dentition intact  Comments: Several missing in top back   Pulmonary  Breath sounds clear to auscultation               Abdominal  GI exam deferred       Other Findings            Anesthetic Plan    ASA: 3  Anesthesia type: total IV anesthesia          Induction: Intravenous  Anesthetic plan and risks discussed with: Patient

## 2022-03-17 NOTE — DISCHARGE INSTRUCTIONS
Gastrointestinal Esophagogastroduodenoscopy (EGD) - Upper Exam Discharge Instructions    1. Call Dr. Beth Mason at [phone] for any problems or questions. 2. Contact the doctor's office for follow up appointment as directed. 3. Medication may cause drowsiness for several hours, therefore:  · Do not drive or operate machinery for remainder of the day. · No alcohol today. · Do not make any important or legal decisions for 24 hours. · Do not sign any legal documents for 24 hours. 4. Ordinarily, you may resume regular diet and activity after exam unless otherwise specified by your physician. 5. For mild soreness in your throat you may use Cepacol throat lozenges or warm salt-water gargles as needed. Gastrointestinal Colonoscopy/Flexible Sigmoidoscopy - Lower Exam Discharge Instructions  1. Because of air put into your colon during exam, you may experience some abdominal distension, relieved by the passage of gas, for several hours. 2. Contact your physician if you have any of the following:  · Excessive amount of bleeding - large amount when having a bowel movement. Occasional specks of blood with bowel movement would not be unusual.  · Severe abdominal pain  · Fever or Chills  3. Polyp Removal - follow these additional instructions  · Clear liquid diet for the next meal (jell-o, broth, clear drinks)  · Soft diet for 24 hours, then resume regular diet   · Take Metamucil - 1 tablespoon in juice every morning for 3 days  · No Aspirin, Advil, Aleve, Nuprin, Ibuprofen, or medications that contain these drugs for 2 weeks. Any additional instructions:    1. Office will call you to follow up on pathology results and to schedule future appointments. 2. Next colonoscopy in 5 years. Diverticulosis: Care Instructions  Your Care Instructions  In diverticulosis, pouches called diverticula form in the wall of the large intestine (colon). The pouches do not cause any pain or other symptoms.  Most people who have diverticulosis do not know they have it. But the pouches sometimes bleed, and if they become infected, they can cause pain and other symptoms. When this happens, it is called diverticulitis. Diverticula form when pressure pushes the wall of the colon outward at certain weak points. A diet that is too low in fiber can cause diverticula. Follow-up care is a key part of your treatment and safety. Be sure to make and go to all appointments, and call your doctor if you are having problems. It's also a good idea to know your test results and keep a list of the medicines you take. How can you care for yourself at home? · Include fruits, leafy green vegetables, beans, and whole grains in your diet each day. These foods are high in fiber. · Take a fiber supplement, such as Citrucel or Metamucil, every day if needed. Read and follow all instructions on the label. · Drink plenty of fluids. If you have kidney, heart, or liver disease and have to limit fluids, talk with your doctor before you increase the amount of fluids you drink. · Get at least 30 minutes of exercise on most days of the week. Walking is a good choice. You also may want to do other activities, such as running, swimming, cycling, or playing tennis or team sports. · Cut out foods that cause gas, pain, or other symptoms. When should you call for help? Call your doctor now or seek immediate medical care if:    · You have belly pain.     · You pass maroon or very bloody stools.     · You have a fever.     · You have nausea and vomiting.     · You have unusual changes in your bowel movements or abdominal swelling.     · You have burning pain when you urinate.     · You have abnormal vaginal discharge.     · You have shoulder pain.     · You have cramping pain that does not get better when you have a bowel movement or pass gas.     · You pass gas or stool from your urethra while urinating.    Watch closely for changes in your health, and be sure to contact your doctor if you have any problems. Where can you learn more? Go to http://www.gray.com/  Enter [id number] in the search box to learn more about \"Diverticulosis: Care Instructions. \"  Current as of: September 8, 2021               Content Version: 13.2  © 4531-4551 [employer]. Care instructions adapted under license by [employer] (which disclaims liability or warranty for this information). If you have questions about a medical condition or this instruction, always ask your healthcare professional. [address] any warranty or liability for your use of this information.

## 2022-03-17 NOTE — H&P
[facility] H&P (Admission)        Date:  3/17/2022    Chief Complaint:  Hx polyps, portal HTN    Subjective:     History of Present Illness:  Patient is a 66 y.o. being admitted for EGD/colon. PMH:  Past Medical History:   Diagnosis Date    Anemia     iron supplement daily    Arthritis     osteo    Cancer ([address]     left breast    Chronic pain     left knee    Cirrhosis ([address]     Dementia ([address]     Diabetes ([address]     Type 2; insulin; average FBS 120s, S&S hypoglycemia BS 47    Diverticulosis     Epistaxis     GERD (gastroesophageal reflux disease)     controlled with medication    GI bleed     History of COVID-19     states was diagnosed in Dec. 2020 but tested positive through 2021; states was hospitalized at the time for a fall but not for Covid and reports that symptoms were mild    Hypercholesteremia     on med     Hypertension     controlled with medication    Morbid obesity ([facility] 2014    BMI=34.2    Psychiatric disorder     depression    PUD (peptic ulcer disease)     ~2019    S/P total knee arthroplasty 2014    Thyroid disease     hypothyroid=on med    Unspecified adverse effect of anesthesia     NO BP or Venipuncture left arm       PSH:  Past Surgical History:   Procedure Laterality Date    HX  SECTION      x1    HX DILATION AND EVACUATION  's    HX ENDOSCOPY  2019    HX HYSTERECTOMY      HX KNEE ARTHROSCOPY Left     HX KNEE REPLACEMENT Left     HX KNEE REPLACEMENT Left     HX MASTECTOMY Left     HX ORTHOPAEDIC Right 2014    carpal tunnel    HX ORTHOPAEDIC Left     ganglion cyst removed from knee    HX ORTHOPAEDIC Left     carpal tunnel    HX ORTHOPAEDIC Left     ganglion cyst wrist    HX TOTAL COLECTOMY         Allergies:   Allergies   Allergen Reactions    Demerol [Meperidine] Other (comments)     Stopped breathing    Dilaudid [Hydromorphone] Other (comments)     hallucinations    Macrodantin [Nitrofurantoin Macrocrystalline] Other (comments)     Shaking, passed out    Sulfa (Sulfonamide Antibiotics) Swelling     swelling in throat, hives    Tape [Adhesive] Rash     bandaids       Home Medications:  Prior to Admission medications    Medication Sig Start Date End Date Taking? Authorizing Provider   LACTULOSE PO Take  by mouth. 2 teaspoons daily   Yes Provider, Historical   MAGNESIUM PO Take  by mouth daily. Yes Provider, Historical   ascorbic acid (VITAMIN C PO) Take  by mouth daily. Yes Provider, Historical   carvediloL (COREG) 6.25 mg tablet Take 6.25 mg by mouth daily. .   Yes Provider, Historical   spironolactone (ALDACTONE) 100 mg tablet Take 100 mg by mouth daily. Yes Provider, Historical   losartan (COZAAR) 50 mg tablet Take 50 mg by mouth daily. Yes Provider, Historical   donepeziL (ARICEPT) 5 mg tablet Take 5 mg by mouth nightly. Yes Provider, Historical   ferrous sulfate (IRON) 325 mg (65 mg iron) tablet Take  by mouth Daily (before breakfast). Takes 3 tabs at bedtime   Yes Provider, Historical   insulin NPH (NOVOLIN N, HUMULIN N) 100 unit/mL injection 35-48 Units by SubCUTAneous route two (2) times a day. NPH and Mixed Insulin instructions:    35 units  Every evening  48 units every morning 11/12/19  Yes Provider, Historical   thyroid, Pork, (ARMOUR) 60 mg tablet Take 60 mg by mouth daily. 11/12/19  Yes Provider, Historical   fluticasone propionate (FLONASE) 50 mcg/actuation nasal spray 2 Sprays by Nasal route daily. . 11/12/19  Yes Provider, Historical   solifenacin (VESICARE) 5 mg tablet Take 5 mg by mouth daily. 1/24/20  Yes Provider, Historical   insulin regular (NOVOLIN R REGULAR U-100 INSULN) 100 unit/mL injection 4 Units by SubCUTAneous route two (2) times a day. 4 units if >150   Yes Provider, Historical   mirtazapine (REMERON) 15 mg tablet Take 15 mg by mouth nightly.    Yes Provider, Historical   dicyclomine (BENTYL) 10 mg capsule Take 10 mg by mouth three (3) times daily as needed. Yes Provider, Historical   omeprazole (PRILOSEC) 20 mg capsule Take 20 mg by mouth daily. Yes Provider, Historical   cholecalciferol (VITAMIN D3) 1,000 unit cap Take 1,000 Units by mouth daily. Yes Provider, Historical   cyanocobalamin, vitamin B-12, (VITAMIN B-12 PO) Take 1 Tab by mouth daily. Yes Provider, Historical   simvastatin (ZOCOR) 20 mg tablet Take 20 mg by mouth nightly. Yes Provider, Historical   multivitamin (ONE A DAY) tablet Take 1 Tab by mouth daily. Yes Provider, Historical   venlafaxine-SR (EFFEXOR-XR) 150 mg capsule Take 150 mg by mouth nightly. Patient not taking: Reported on 3/15/2022    Provider, Historical       Hospital Medications:  Current Facility-Administered Medications   Medication Dose Route Frequency    lactated Ringers infusion 1,000 mL  1,000 mL IntraVENous CONTINUOUS       Social History:  Social History     Tobacco Use    Smoking status: Never Smoker    Smokeless tobacco: Never Used   Substance Use Topics    Alcohol use: No         Family History:  Family History   Problem Relation Age of Onset    Alzheimer's Disease Mother     Other Father          of ruptured AAA    Heart defect Sister          of MI age 68    Other Sister         Lang Louder disease-2 sisters with it    Alcohol abuse Brother     Diabetes Sister     Colon Cancer Sister     Colon Cancer Brother     Stroke Sister     Dementia Sister     Sudden Death Other        Review of Systems:  A detailed 10 system ROS is obtained, with pertinent positives as listed above. All others are negative. Objective:     Physical Exam:  Vitals:  Visit Vitals  /60   Pulse (!) 57   Temp 98.5 °F (36.9 °C)   Resp 16   Ht 5' (1.524 m)   Wt 82.6 kg (182 lb)   SpO2 99%   BMI 35.54 kg/m²     Gen:  Pt is alert, cooperative, no acute distress  Skin: no large lesions  HEENT: MMM  Cardiovascular: Regular rate and rhythm. No murmurs, gallops, or rubs.   Respiratory:  Comfortable breathing with no accessory muscle use. Clear breath sounds with no wheezes, rales, or rhonchi. GI:  Abdomen nondistended, soft, and nontender. Normal active bowel sounds. Neurological:  Gross memory appears intact. Patient is alert and oriented. Psychiatric:  Mood appears appropriate with judgement intact. Laboratory:    No results for input(s): WBC, HGB, HCT, PLT, MCV, NA, K, CL, CO2, BUN, CREA, CA, MG, GLU, AP, TBIL, CBIL, ALB, TP, AML, LPSE, PTP, INR, APTT, HGBEXT, HCTEXT, PLTEXT, INREXT in the last 72 hours. No lab exists for component: SGOT, GPT, DBIL    Assessment:       Active Problems:    * No active hospital problems. *      Plan:     Endoscopy and risks explained to the patient. Risks including reaction to sedation, cardiopulmonary events, infection, bleeding, perforation, requirement for surgery if complications should occur, death were explained to patient who expressed understanding and agreed to proceed with endoscopy.         Corinne Kilgore MD  [facility], [place]

## 2022-10-28 ENCOUNTER — PREP FOR PROCEDURE (OUTPATIENT)
Dept: ADMINISTRATIVE | Age: 72
End: 2022-10-28

## 2022-11-08 RX ORDER — SODIUM CHLORIDE 0.9 % (FLUSH) 0.9 %
5-40 SYRINGE (ML) INJECTION EVERY 12 HOURS SCHEDULED
Status: CANCELLED | OUTPATIENT
Start: 2022-11-08

## 2022-11-08 RX ORDER — SODIUM CHLORIDE 9 MG/ML
INJECTION, SOLUTION INTRAVENOUS PRN
Status: CANCELLED | OUTPATIENT
Start: 2022-11-08

## 2022-11-08 RX ORDER — SODIUM CHLORIDE 0.9 % (FLUSH) 0.9 %
5-40 SYRINGE (ML) INJECTION PRN
Status: CANCELLED | OUTPATIENT
Start: 2022-11-08

## 2022-12-08 RX ORDER — GABAPENTIN 100 MG/1
100 CAPSULE ORAL 2 TIMES DAILY
COMMUNITY

## 2022-12-08 RX ORDER — ESCITALOPRAM OXALATE 5 MG/1
5 TABLET ORAL DAILY
COMMUNITY

## 2022-12-08 NOTE — PERIOP NOTE
Patient verified name, , and procedure. Type: 1a; abbreviated assessment per anesthesia guidelines  Labs per surgeon: none  Labs per anesthesia: poc glucose      Instructed pt that they will be notified by the Gi Lab for time of arrival. If any questions please call the GI lab at [phone]. Follow diet and prep instructions per office. May have clear liquids until 4 hours prior to time of arrival.    Monona Roxo or shower the night before and the am of surgery with antibacterial soap. No lotions, oils, powders, cologne on skin. No make up, eye make up or jewelry. Wear loose fitting comfortable, clean clothing. Must have adult present in building the entire time . Medications for the day of procedure carvedilol, gabapentin, Remeron, omeprazole, thyroid ([place])  NPH and Mixed insulin (70/30)  Per anesthesia protocol:  Day before procedure:  Am: Take 80% of usual dose- adjusted dose 38 units   Pm: Take 80% of usual dose- adjusted dose 28  units   Morning of procedure:  Check glucose; If under 120 do not take any. If over 120 take 50% of usual dose- adjusted dose 24 units      The following discharge instructions reviewed with patient: medication given during procedure may cause drowsiness for several hours, therefore, do not drive or operate machinery for remainder of the day, no alcohol on the day of your procedure, resume regular diet and activity unless otherwise directed, for mild sore throat you may use Cepacol throat lozenges or warm salt water gargles as needed, call your physician for any problems or questions. Patient verbalizes understanding.

## 2022-12-13 ENCOUNTER — ANESTHESIA (OUTPATIENT)
Dept: ENDOSCOPY | Age: 72
End: 2022-12-13
Payer: MEDICARE

## 2022-12-13 ENCOUNTER — HOSPITAL ENCOUNTER (OUTPATIENT)
Age: 72
Setting detail: OUTPATIENT SURGERY
Discharge: HOME | End: 2022-12-13
Attending: INTERNAL MEDICINE | Admitting: INTERNAL MEDICINE
Payer: MEDICARE

## 2022-12-13 ENCOUNTER — ANESTHESIA EVENT (OUTPATIENT)
Dept: ENDOSCOPY | Age: 72
End: 2022-12-13
Payer: MEDICARE

## 2022-12-13 VITALS
BODY MASS INDEX: 38.87 KG/M2 | SYSTOLIC BLOOD PRESSURE: 170 MMHG | TEMPERATURE: 97.5 F | RESPIRATION RATE: 18 BRPM | DIASTOLIC BLOOD PRESSURE: 74 MMHG | HEIGHT: 60 IN | WEIGHT: 198 LBS | HEART RATE: 55 BPM | OXYGEN SATURATION: 96 %

## 2022-12-13 LAB
GLUCOSE BLD STRIP.AUTO-MCNC: 109 MG/DL (ref 65–100)
SERVICE CMNT-IMP: ABNORMAL

## 2022-12-13 PROCEDURE — 2720000010 HC SURG SUPPLY STERILE: Performed by: INTERNAL MEDICINE

## 2022-12-13 PROCEDURE — 6360000002 HC RX W HCPCS: Performed by: NURSE ANESTHETIST, CERTIFIED REGISTERED

## 2022-12-13 PROCEDURE — 3609017100 HC EGD: Performed by: INTERNAL MEDICINE

## 2022-12-13 PROCEDURE — 2709999900 HC NON-CHARGEABLE SUPPLY: Performed by: INTERNAL MEDICINE

## 2022-12-13 PROCEDURE — 7100000010 HC PHASE II RECOVERY - FIRST 15 MIN: Performed by: INTERNAL MEDICINE

## 2022-12-13 PROCEDURE — 7100000011 HC PHASE II RECOVERY - ADDTL 15 MIN: Performed by: INTERNAL MEDICINE

## 2022-12-13 PROCEDURE — 2500000003 HC RX 250 WO HCPCS: Performed by: NURSE ANESTHETIST, CERTIFIED REGISTERED

## 2022-12-13 PROCEDURE — 2580000003 HC RX 258: Performed by: ANESTHESIOLOGY

## 2022-12-13 PROCEDURE — 3700000000 HC ANESTHESIA ATTENDED CARE: Performed by: INTERNAL MEDICINE

## 2022-12-13 PROCEDURE — 82962 GLUCOSE BLOOD TEST: CPT

## 2022-12-13 RX ORDER — SODIUM CHLORIDE 0.9 % (FLUSH) 0.9 %
5-40 SYRINGE (ML) INJECTION EVERY 12 HOURS SCHEDULED
Status: DISCONTINUED | OUTPATIENT
Start: 2022-12-13 | End: 2022-12-13 | Stop reason: HOSPADM

## 2022-12-13 RX ORDER — PROPOFOL 10 MG/ML
INJECTION, EMULSION INTRAVENOUS PRN
Status: DISCONTINUED | OUTPATIENT
Start: 2022-12-13 | End: 2022-12-13 | Stop reason: SDUPTHER

## 2022-12-13 RX ORDER — LIDOCAINE HYDROCHLORIDE 20 MG/ML
INJECTION, SOLUTION EPIDURAL; INFILTRATION; INTRACAUDAL; PERINEURAL PRN
Status: DISCONTINUED | OUTPATIENT
Start: 2022-12-13 | End: 2022-12-13 | Stop reason: SDUPTHER

## 2022-12-13 RX ORDER — SODIUM CHLORIDE 0.9 % (FLUSH) 0.9 %
5-40 SYRINGE (ML) INJECTION PRN
Status: DISCONTINUED | OUTPATIENT
Start: 2022-12-13 | End: 2022-12-13 | Stop reason: HOSPADM

## 2022-12-13 RX ORDER — SODIUM CHLORIDE, SODIUM LACTATE, POTASSIUM CHLORIDE, CALCIUM CHLORIDE 600; 310; 30; 20 MG/100ML; MG/100ML; MG/100ML; MG/100ML
INJECTION, SOLUTION INTRAVENOUS CONTINUOUS
Status: DISCONTINUED | OUTPATIENT
Start: 2022-12-13 | End: 2022-12-13 | Stop reason: HOSPADM

## 2022-12-13 RX ORDER — SODIUM CHLORIDE 9 MG/ML
INJECTION, SOLUTION INTRAVENOUS PRN
Status: DISCONTINUED | OUTPATIENT
Start: 2022-12-13 | End: 2022-12-13 | Stop reason: HOSPADM

## 2022-12-13 RX ADMIN — LIDOCAINE HYDROCHLORIDE 100 MG: 20 INJECTION, SOLUTION EPIDURAL; INFILTRATION; INTRACAUDAL; PERINEURAL at 11:04

## 2022-12-13 RX ADMIN — PROPOFOL 20 MG: 10 INJECTION, EMULSION INTRAVENOUS at 11:07

## 2022-12-13 RX ADMIN — SODIUM CHLORIDE, POTASSIUM CHLORIDE, SODIUM LACTATE AND CALCIUM CHLORIDE: 600; 310; 30; 20 INJECTION, SOLUTION INTRAVENOUS at 10:55

## 2022-12-13 RX ADMIN — PROPOFOL 50 MG: 10 INJECTION, EMULSION INTRAVENOUS at 11:04

## 2022-12-13 RX ADMIN — PROPOFOL 20 MG: 10 INJECTION, EMULSION INTRAVENOUS at 11:09

## 2022-12-13 RX ADMIN — PROPOFOL 20 MG: 10 INJECTION, EMULSION INTRAVENOUS at 11:11

## 2022-12-13 RX ADMIN — PROPOFOL 30 MG: 10 INJECTION, EMULSION INTRAVENOUS at 11:06

## 2022-12-13 ASSESSMENT — PAIN - FUNCTIONAL ASSESSMENT: PAIN_FUNCTIONAL_ASSESSMENT: NONE - DENIES PAIN

## 2022-12-13 NOTE — OP NOTE
Esophagogastroduodenoscopy    DATE of PROCEDURE: 12/13/2022    INDICATIONS:  1. Portal hypertension    MEDICATIONS ADMINISTERED: MAC    INSTRUMENT: GIF    PROCEDURE:  After obtaining informed consent, the patient was placed in the left lateral position and sedated. The endoscope was advanced under direct vision without difficulty. The esophagus, stomach (including retroflexed views) and duodenum were evaluated. The patient was taken to the recovery area in stable condition. FINDINGS:  ESOPHAGUS: Large esophageal varices without high-risk stigmata. One band successfully applied with decompression of all the varices. STOMACH:   Portal hypertensive gastropathy present. Otherwise normal.  DUODENUM:   Normal    Estimated blood loss: 0-minimal     PLAN:  1.   Repeat EGD 3 months    Alis Dodd MD  [facility], [place]

## 2022-12-13 NOTE — ANESTHESIA POSTPROCEDURE EVALUATION
Department of Anesthesiology  Postprocedure Note    Patient: Michael Celis  MRN: [id number]  YOB: 1950  Date of evaluation: 12/13/2022      Procedure Summary     Date: 12/13/22 Room / Location: [facility] ENDO 04 / [facility] ENDOSCOPY    Anesthesia Start: 1101 Anesthesia Stop: 4860    Procedure: EGD Banding (Upper GI Region) Diagnosis:       Hepatomegaly      (Hepatomegaly [R16.0])    Surgeons: Paty Garza MD Responsible Provider: Bridget Hoover MD    Anesthesia Type: TIVA ASA Status: 3          Anesthesia Type: No value filed.     Fidencio Phase I: Fidencio Score: 10    Fidencio Phase II: Fidencio Score: 10      Anesthesia Post Evaluation    Patient location during evaluation: PACU  Patient participation: complete - patient participated  Level of consciousness: awake and alert  Airway patency: patent  Nausea & Vomiting: no nausea and no vomiting  Complications: no  Cardiovascular status: hemodynamically stable  Respiratory status: acceptable, nonlabored ventilation and spontaneous ventilation  Hydration status: euvolemic  Comments: BP (!) 170/74   Pulse 55   Temp 97.5 °F (36.4 °C) (Skin)   Resp 18   Ht 5' (1.524 m)   Wt 198 lb (89.8 kg)   SpO2 96%   BMI 38.67 kg/m²     Multimodal analgesia pain management approach

## 2022-12-13 NOTE — H&P
[facility] H&P (Admission)        Date:  2022    Chief Complaint:  portal HTN    Subjective:     History of Present Illness:  Patient is a 67 y.o. being admitted for EGD.     PMH:  Past Medical History:   Diagnosis Date    Anemia     iron supplement daily    Arthritis     osteo    Cancer ([address]     left breast    Chronic pain     left knee    Cirrhosis ([address]     Dementia ([address]     Diabetes ([address]     Type 2; insulin; average FBS 120s, S&S hypoglycemia BS 47    Diverticulosis     Epistaxis     GERD (gastroesophageal reflux disease)     controlled with medication    GI bleed     History of COVID-19     states was diagnosed in Dec. 2020 but tested positive through 2021; states was hospitalized at the time for a fall but not for Covid and reports that symptoms were mild    Hypercholesteremia     on med     Hypertension     controlled with medication    Morbid obesity ([address] 2014    BMI=34.2    Psychiatric disorder     depression    PUD (peptic ulcer disease)     S/P total knee arthroplasty 2014    Thyroid disease     hypothyroid=on med    Unspecified adverse effect of anesthesia     NO BP or Venipuncture left arm       PSH:  Past Surgical History:   Procedure Laterality Date     SECTION      x1    COLONOSCOPY N/A 3/17/2022    COLONOSCOPY/ 29 performed by Love Aaron MD at [address] [place] [address]  's    HYSTERECTOMY (CERVIX STATUS UNKNOWN)      KNEE ARTHROSCOPY Left     MASTECTOMY Left     ORTHOPEDIC SURGERY Right 2014    carpal tunnel    ORTHOPEDIC SURGERY Left     ganglion cyst removed from knee    ORTHOPEDIC SURGERY Left     carpal tunnel    ORTHOPEDIC SURGERY Left     ganglion cyst wrist    TOTAL COLECTOMY  2003    TOTAL KNEE ARTHROPLASTY Left     TOTAL KNEE ARTHROPLASTY Left     UPPER GASTROINTESTINAL ENDOSCOPY  2019    US GUIDED LIVER BIOPSY PERCUTANEOUS  2018    US GUIDED LIVER BIOPSY PERCUTANEOUS 11/12/2018 [facility]       Allergies: Allergies   Allergen Reactions    Hydromorphone Other (See Comments)     hallucinations    Meperidine Other (See Comments)     Stopped breathing    Nitrofurantoin Other (See Comments)     Shaking, passed out    Sulfa Antibiotics Swelling     swelling in throat, hives    Adhesive Tape Rash     bandaids       Home Medications:  Prior to Admission medications    Medication Sig Start Date End Date Taking? Authorizing Provider   gabapentin (NEURONTIN) 100 MG capsule Take 100 mg by mouth 2 times daily at 0800 and 1400.    Yes Historical Provider, MD   escitalopram (LEXAPRO) 5 MG tablet Take 5 mg by mouth daily   Yes Historical Provider, MD   LACTULOSE PO Take by mouth    Ar Automatic Reconciliation   MAGNESIUM PO Take by mouth daily    Ar Automatic Reconciliation   carvedilol (COREG) 6.25 MG tablet Take 12.5 mg by mouth 2 times daily (with meals)    Ar Automatic Reconciliation   vitamin D 25 MCG (1000 UT) CAPS Take 1,000 Units by mouth daily    Ar Automatic Reconciliation   dicyclomine (BENTYL) 10 MG capsule Take 10 mg by mouth 3 times daily as needed    Ar Automatic Reconciliation   donepezil (ARICEPT) 5 MG tablet Take 5 mg by mouth    Ar Automatic Reconciliation   ferrous sulfate (IRON 325) 325 (65 Fe) MG tablet Take by mouth every morning (before breakfast)    Ar Automatic Reconciliation   fluticasone (FLONASE) 50 MCG/ACT nasal spray 2 sprays by Nasal route daily 11/12/19   Ar Automatic Reconciliation   insulin NPH (HUMULIN N;NOVOLIN N) 100 UNIT/ML injection vial Inject 35-48 Units into the skin 2 times daily 48 units in am and 35 units at night 11/12/19   Ar Automatic Reconciliation   insulin regular (HUMULIN R;NOVOLIN R) 100 UNIT/ML injection Inject 4 Units into the skin 2 times daily    Ar Automatic Reconciliation   losartan (COZAAR) 50 MG tablet Take 50 mg by mouth daily    Ar Automatic Reconciliation   mirtazapine (REMERON) 15 MG tablet Take 15 mg by mouth    Ar Automatic Reconciliation   omeprazole (PRILOSEC) 20 MG delayed release capsule Take 20 mg by mouth daily    Ar Automatic Reconciliation   simvastatin (ZOCOR) 20 MG tablet Take 20 mg by mouth    Ar Automatic Reconciliation   spironolactone (ALDACTONE) 100 MG tablet Take 100 mg by mouth daily    Ar Automatic Reconciliation   thyroid (ARMOUR) 60 MG tablet Take 60 mg by mouth daily 19   Ar Automatic Reconciliation       Hospital Medications:  Current Facility-Administered Medications   Medication Dose Route Frequency    sodium chloride flush 0.9 % injection 5-40 mL  5-40 mL IntraVENous 2 times per day    sodium chloride flush 0.9 % injection 5-40 mL  5-40 mL IntraVENous PRN    0.9 % sodium chloride infusion   IntraVENous PRN    lactated ringers infusion   IntraVENous Continuous       Social History:  Social History     Tobacco Use    Smoking status: Never    Smokeless tobacco: Never   Substance Use Topics    Alcohol use: No         Family History:  Family History   Problem Relation Age of Onset    Stroke Sister     Alcohol Abuse Brother     Other Father          of ruptured AAA    Dementia Sister     Sudden Death Other     Colon Cancer Brother     Diabetes Sister     Colon Cancer Sister     Other Sister         Tressia Cox disease-2 sisters with it    Alzheimer's Disease Mother     Heart Defect Sister          of MI age 68       Review of Systems:  A detailed 10 system ROS is obtained, with pertinent positives as listed above. All others are negative. Objective:     Physical Exam:  Vitals:  BP (!) 143/67   Pulse 60   Temp 98.6 °F (37 °C) (Oral)   Resp 18   Ht 5' (1.524 m)   Wt 198 lb (89.8 kg)   SpO2 95%   BMI 38.67 kg/m²   Gen:  Pt is alert, cooperative, no acute distress  Skin: no large lesions  HEENT: MMM  Cardiovascular: Regular rate and rhythm. No murmurs, gallops, or rubs. Respiratory:  Comfortable breathing with no accessory muscle use.  Clear breath sounds with no wheezes, rales, or rhonchi. GI:  Abdomen nondistended, soft, and nontender. Normal active bowel sounds. Neurological:  Gross memory appears intact. Patient is alert and oriented. Psychiatric:  Mood appears appropriate with judgement intact. Laboratory:    No results for input(s): WBC, HGB, HCT, PLT, MCV, NA, K, CL, CO2, BUN, CREA, CA, MG, GLU, ALB, TP, AML, INR, APTT in the last 72 hours. Invalid input(s): AP, SGOT, GPT, TBIL, DBIL, CBIL, LPSE, PTP    Assessment:       Active Problems:    * No active hospital problems. *  Resolved Problems:    * No resolved hospital problems. *      Plan:     Endoscopy and risks explained to the patient. Risks including reaction to sedation, cardiopulmonary events, infection, bleeding, perforation, requirement for surgery if complications should occur, death were explained to patient who expressed understanding and agreed to proceed with endoscopy.         Pallavi Ayala MD  [facility], [place]

## 2022-12-13 NOTE — PROGRESS NOTES
1150-Discharge instructions were reviewed with patient and pts son, Alfonso Mace. An opportunity was given for questions. Patient and christopher verbalized understanding, and has no questions at this time. 1205-To lobby via wheelchair accompanied by staff. Discharged to home in good condition via private vehicle.

## 2022-12-13 NOTE — DISCHARGE INSTRUCTIONS
Gastrointestinal Esophagogastroduodenoscopy (EGD) - Upper Exam Discharge Instructions    1. Call Dr. Jason Samuel at [phone] for any problems or questions. 2. Contact the doctor's office for follow up appointment as directed. 3. Medication may cause drowsiness for several hours, therefore:  Do not drive or operate machinery for remainder of the day. No alcohol today. Do not make any important or legal decisions for 24 hours. Do not sign any legal documents for 24 hours. 5. Soft diet for the next 24 hours then you may resume regular diet and activity 24 hours after exam.     6. For mild soreness in your throat you may use Cepacol throat lozenges or warm  salt-water gargles as needed.

## 2022-12-13 NOTE — ANESTHESIA PRE PROCEDURE
Department of Anesthesiology  Preprocedure Note       Name:  Tiffany Thomas   Age:  67 y.o.  :  1950                                          MRN:  [id number]         Date:  2022      Surgeon: Jimenez Schuler):  Corinne Katz, MD    Procedure: Procedure(s):  EGD DIAGNOSTIC ONLY/ 35    Medications prior to admission:   Prior to Admission medications    Medication Sig Start Date End Date Taking? Authorizing Provider   gabapentin (NEURONTIN) 100 MG capsule Take 100 mg by mouth 2 times daily at 0800 and 1400.    Yes Historical Provider, MD   escitalopram (LEXAPRO) 5 MG tablet Take 5 mg by mouth daily   Yes Historical Provider, MD   LACTULOSE PO Take by mouth    Ar Automatic Reconciliation   MAGNESIUM PO Take by mouth daily    Ar Automatic Reconciliation   carvedilol (COREG) 6.25 MG tablet Take 12.5 mg by mouth 2 times daily (with meals)    Ar Automatic Reconciliation   vitamin D 25 MCG (1000 UT) CAPS Take 1,000 Units by mouth daily    Ar Automatic Reconciliation   dicyclomine (BENTYL) 10 MG capsule Take 10 mg by mouth 3 times daily as needed    Ar Automatic Reconciliation   donepezil (ARICEPT) 5 MG tablet Take 5 mg by mouth    Ar Automatic Reconciliation   ferrous sulfate (IRON 325) 325 (65 Fe) MG tablet Take by mouth every morning (before breakfast)    Ar Automatic Reconciliation   fluticasone (FLONASE) 50 MCG/ACT nasal spray 2 sprays by Nasal route daily 19   Ar Automatic Reconciliation   insulin NPH (HUMULIN N;NOVOLIN N) 100 UNIT/ML injection vial Inject 35-48 Units into the skin 2 times daily 48 units in am and 35 units at night 19   Ar Automatic Reconciliation   insulin regular (HUMULIN R;NOVOLIN R) 100 UNIT/ML injection Inject 4 Units into the skin 2 times daily    Ar Automatic Reconciliation   losartan (COZAAR) 50 MG tablet Take 50 mg by mouth daily    Ar Automatic Reconciliation   mirtazapine (REMERON) 15 MG tablet Take 15 mg by mouth    Ar Automatic Reconciliation   omeprazole (PRILOSEC) 20 MG delayed release capsule Take 20 mg by mouth daily    Ar Automatic Reconciliation   simvastatin (ZOCOR) 20 MG tablet Take 20 mg by mouth    Ar Automatic Reconciliation   spironolactone (ALDACTONE) 100 MG tablet Take 100 mg by mouth daily    Ar Automatic Reconciliation   thyroid (ARMOUR) 60 MG tablet Take 60 mg by mouth daily 11/12/19   Ar Automatic Reconciliation       Current medications:    Current Facility-Administered Medications   Medication Dose Route Frequency Provider Last Rate Last Admin    sodium chloride flush 0.9 % injection 5-40 mL  5-40 mL IntraVENous 2 times per day Kilo Desir MD        sodium chloride flush 0.9 % injection 5-40 mL  5-40 mL IntraVENous PRN Kilo Desir MD        0.9 % sodium chloride infusion   IntraVENous PRN Kilo Desir MD           Allergies:     Allergies   Allergen Reactions    Hydromorphone Other (See Comments)     hallucinations    Meperidine Other (See Comments)     Stopped breathing    Nitrofurantoin Other (See Comments)     Shaking, passed out    Sulfa Antibiotics Swelling     swelling in throat, hives    Adhesive Tape Rash     bandaids       Problem List:    Patient Active Problem List   Diagnosis Code    Morbid obesity ([facility] E66.01    S/P total knee arthroplasty Z96.659    DM type 2 (diabetes mellitus, type 2) ([facility] E11.9    Hypertension I10    Hypoxia R09.02    Fever R50.9    Hypercholesteremia E78.00    Hypothyroidism E03.9       Past Medical History:        Diagnosis Date    Anemia     iron supplement daily    Arthritis     osteo    Cancer ([facility] 2001    left breast    Chronic pain     left knee    Cirrhosis (HCC)     Dementia (HCC)     Diabetes ([facility]     Type 2; insulin; average FBS 120s, S&S hypoglycemia BS 47    Diverticulosis     Epistaxis     GERD (gastroesophageal reflux disease)     controlled with medication    GI bleed     History of COVID-19     states was diagnosed in Dec. 2020 but tested positive through 2/2021; states was hospitalized at the time for a fall but not for Covid and reports that symptoms were mild    Hypercholesteremia     on med     Hypertension     controlled with medication    Morbid obesity ([address] 2014    BMI=34.2    Psychiatric disorder     depression    PUD (peptic ulcer disease)     S/P total knee arthroplasty 2014    Thyroid disease     hypothyroid=on med    Unspecified adverse effect of anesthesia     NO BP or Venipuncture left arm       Past Surgical History:        Procedure Laterality Date     SECTION      x1    COLONOSCOPY N/A 3/17/2022    COLONOSCOPY/ 29 performed by Enio Dacosta MD at [address] [place] [address]  's    HYSTERECTOMY (CERVIX STATUS UNKNOWN)      KNEE ARTHROSCOPY Left     MASTECTOMY Left     ORTHOPEDIC SURGERY Right 2014    carpal tunnel    ORTHOPEDIC SURGERY Left     ganglion cyst removed from knee    ORTHOPEDIC SURGERY Left     carpal tunnel    ORTHOPEDIC SURGERY Left     ganglion cyst wrist    TOTAL COLECTOMY  2003    TOTAL KNEE ARTHROPLASTY Left     TOTAL KNEE ARTHROPLASTY Left     UPPER GASTROINTESTINAL ENDOSCOPY  2019    US GUIDED LIVER BIOPSY PERCUTANEOUS  2018    US GUIDED LIVER BIOPSY PERCUTANEOUS 2018 [facility]       Social History:    Social History     Tobacco Use    Smoking status: Never    Smokeless tobacco: Never   Substance Use Topics    Alcohol use:  No                                Counseling given: Not Answered      Vital Signs (Current):   Vitals:    22 1408 22 1035   BP:  (!) 143/67   Pulse:  60   Resp:  18   Temp:  98.6 °F (37 °C)   TempSrc:  Oral   SpO2:  95%   Weight: 198 lb (89.8 kg) 198 lb (89.8 kg)   Height: 5' (1.524 m) 5' (1.524 m)                                              BP Readings from Last 3 Encounters:   22 (!) 143/67       NPO Status: Time of last liquid consumption: 0700                        Time of last solid consumption: 1700                        Date of last liquid consumption: 12/13/22                        Date of last solid food consumption: 12/12/22    BMI:   Wt Readings from Last 3 Encounters:   12/13/22 198 lb (89.8 kg)   03/15/22 182 lb (82.6 kg)   05/11/21 177 lb (80.3 kg)     Body mass index is 38.67 kg/m². CBC: No results found for: WBC, RBC, HGB, HCT, MCV, RDW, PLT    CMP: No results found for: NA, K, CL, CO2, BUN, CREATININE, GFRAA, AGRATIO, LABGLOM, GLUCOSE, GLU, PROT, CALCIUM, BILITOT, ALKPHOS, AST, ALT    POC Tests: No results for input(s): POCGLU, POCNA, POCK, POCCL, POCBUN, POCHEMO, POCHCT in the last 72 hours. Coags: No results found for: PROTIME, INR, APTT    HCG (If Applicable): No results found for: PREGTESTUR, PREGSERUM, HCG, HCGQUANT     ABGs: No results found for: PHART, PO2ART, BUF0ATN, YRR8EOL, BEART, W2RHFSAZ     Type & Screen (If Applicable):  No results found for: LABABO, LABRH    Drug/Infectious Status (If Applicable):  No results found for: HIV, HEPCAB    COVID-19 Screening (If Applicable):   Lab Results   Component Value Date/Time    COVID19 Performed 05/05/2021 06:03 PM    COVID19 Not Detected 05/05/2021 06:03 PM           Anesthesia Evaluation  Patient summary reviewed  Airway: Mallampati: II  TM distance: >3 FB   Neck ROM: full     Dental: normal exam         Pulmonary:Negative Pulmonary ROS and normal exam  breath sounds clear to auscultation                             Cardiovascular:  Exercise tolerance: good (>4 METS),   (+) hypertension:, hyperlipidemia        Rhythm: regular  Rate: normal                 ROS comment: Pt denies recent CP, SOB or changes in functional status. Echo from 3/2021 showed hyperdynamic LV with EF 65%, moderately dilated LA, and mild MR.        Neuro/Psych:   (+) dementia            GI/Hepatic/Renal:   (+) GERD: well controlled, PUD, liver disease (CRYSTAL, Cirrhosis):,           Endo/Other:    (+) DiabetesType II DM, , hypothyroidism: arthritis: OA., .                 Abdominal:              PE comment: Deferred   Vascular: negative vascular ROS. Other Findings:           Anesthesia Plan      TIVA     ASA 3       Induction: intravenous. Anesthetic plan and risks discussed with patient.                         Chapo Donald MD   12/13/2022

## (undated) DEVICE — SYRINGE MED 3ML CLR PLAS STD N CTRL LUERLOCK TIP DISP

## (undated) DEVICE — FORCEPS BX L240CM JAW DIA2.8MM L CAP W/ NDL MIC MESH TOOTH

## (undated) DEVICE — BLOCK BITE AD 60FR W/ VELC STRP ADDRESSES MOST PT AND

## (undated) DEVICE — CONTAINER PREFIL FRMLN 40ML --

## (undated) DEVICE — CANNULA NSL ORAL AD FOR CAPNOFLEX CO2 O2 AIRLFE

## (undated) DEVICE — KENDALL RADIOLUCENT FOAM MONITORING ELECTRODE RECTANGULAR SHAPE: Brand: KENDALL

## (undated) DEVICE — FIAPC® PROBE W/ FILTER 2200 A OD 2.3MM/6.9FR; L 2.2M/7.2FT: Brand: ERBE

## (undated) DEVICE — 1200 GUARD II KIT W/5MM TUBE W/O VAC TUBE: Brand: GUARDIAN

## (undated) DEVICE — SYRINGE, LUER SLIP, STERILE, 60ML: Brand: MEDLINE

## (undated) DEVICE — GAUZE,SPONGE,4"X4",12PLY,WOVEN,NS,LF: Brand: MEDLINE

## (undated) DEVICE — LUBE JELLY FOIL PACK 1.4 OZ: Brand: MEDLINE INDUSTRIES, INC.

## (undated) DEVICE — NDL PRT INJ NSAF BLNT 18GX1.5 --

## (undated) DEVICE — SYR 5ML 1/5 GRAD LL NSAF LF --

## (undated) DEVICE — YANKAUER,BULB TIP,W/O VENT,RIGID,STERILE: Brand: MEDLINE

## (undated) DEVICE — CONNECTOR TBNG OD5-7MM O2 END DISP

## (undated) DEVICE — MULTIPLE BAND LIGATOR: Brand: SPEEDBAND SUPERVIEW SUPER 7

## (undated) DEVICE — SYRINGE MED 10ML LUERLOCK TIP W/O SFTY DISP

## (undated) DEVICE — SYR 3ML LL TIP 1/10ML GRAD --

## (undated) DEVICE — REM POLYHESIVE ADULT PATIENT RETURN ELECTRODE: Brand: VALLEYLAB

## (undated) DEVICE — MEDI-VAC YANKAUER SUCTION HANDLE W/BULBOUS TIP: Brand: CARDINAL HEALTH

## (undated) DEVICE — LIGATOR ENDOSCP DIA8.6-11.5MM MULT DISP SPDBND LIGATOR SUP

## (undated) DEVICE — SINGLE PORT MANIFOLD: Brand: NEPTUNE 2

## (undated) DEVICE — NEEDLE SYR 18GA L1.5IN RED PLAS HUB S STL BLNT FILL W/O

## (undated) DEVICE — AIRLIFE™ OXYGEN TUBING 7 FEET (2.1 M) CRUSH RESISTANT OXYGEN TUBING, VINYL TIPPED: Brand: AIRLIFE™